# Patient Record
Sex: FEMALE | Race: WHITE | Employment: OTHER | URBAN - NONMETROPOLITAN AREA
[De-identification: names, ages, dates, MRNs, and addresses within clinical notes are randomized per-mention and may not be internally consistent; named-entity substitution may affect disease eponyms.]

---

## 2019-05-28 ENCOUNTER — OFFICE VISIT (OUTPATIENT)
Dept: FAMILY MEDICINE CLINIC | Age: 64
End: 2019-05-28
Payer: MEDICARE

## 2019-05-28 VITALS
SYSTOLIC BLOOD PRESSURE: 128 MMHG | TEMPERATURE: 98.3 F | WEIGHT: 195 LBS | BODY MASS INDEX: 38.28 KG/M2 | OXYGEN SATURATION: 98 % | HEART RATE: 101 BPM | DIASTOLIC BLOOD PRESSURE: 68 MMHG | HEIGHT: 60 IN

## 2019-05-28 DIAGNOSIS — M79.622 LEFT UPPER ARM PAIN: Primary | ICD-10-CM

## 2019-05-28 PROCEDURE — 99213 OFFICE O/P EST LOW 20 MIN: CPT | Performed by: INTERNAL MEDICINE

## 2019-05-28 RX ORDER — CYCLOBENZAPRINE HCL 10 MG
TABLET ORAL
Refills: 0 | COMMUNITY
Start: 2019-03-13 | End: 2019-08-21 | Stop reason: SDUPTHER

## 2019-05-28 RX ORDER — OMEPRAZOLE 20 MG/1
CAPSULE, DELAYED RELEASE ORAL
Refills: 1 | COMMUNITY
Start: 2019-04-15 | End: 2019-08-21 | Stop reason: SDUPTHER

## 2019-05-28 RX ORDER — NAPROXEN 500 MG/1
500 TABLET ORAL 2 TIMES DAILY WITH MEALS
Qty: 30 TABLET | Refills: 0 | Status: SHIPPED | OUTPATIENT
Start: 2019-05-28 | End: 2019-08-08

## 2019-05-28 NOTE — PROGRESS NOTES
19  Jayy Yu : 1955 Sex: female  Age: 70 y.o. Chief Complaint   Patient presents with    Swelling     left arm onset        HPI:  Patient reports pain to the left upper arm. The patient states the pain has been present for the last 3 days. Pain is currently on a 510 on the pain scale. The patient denies any trauma or injury. States swelling to upper arm in biceps area. They deny numbness or tingling to the distal extremity. No neck pain. The patient is having difficulty using the affected arm. It is affecting their quality of life due to difficulty performing activities of daily living. They are taking tylenol at home with minimal releif of their discomfort. No h/o blood clots, no ulcers or bleeding. No hear disease. They present for further evaluation. ROS:  Const: Positives and pertinent negatives as per HPI. All others reviewed and are negative. Current Outpatient Medications:     cyclobenzaprine (FLEXERIL) 10 MG tablet, TAKE 1/2 TO 1 TABLET BY MOUTH EVERY NIGHT AT BEDTIME AS NEEDED, Disp: , Rfl: 0    omeprazole (PRILOSEC) 20 MG delayed release capsule, TAKE ONE CAPSULE BY MOUTH EVERY DAY, Disp: , Rfl: 1    naproxen (NAPROSYN) 500 MG tablet, Take 1 tablet by mouth 2 times daily (with meals) for 15 days, Disp: 30 tablet, Rfl: 0    docusate sodium (COLACE) 100 MG capsule, Take 1 capsule by mouth 2 times daily, Disp: 60 capsule, Rfl: 0    sertraline (ZOLOFT) 50 MG tablet, Take 50 mg by mouth nightly, Disp: , Rfl:     Melatonin 2.5 MG CAPS, Take 5 mg by mouth daily, Disp: , Rfl:     LORazepam (ATIVAN) 0.5 MG tablet, Take 0.5 mg by mouth 2 times daily, Disp: , Rfl:     amitriptyline (ELAVIL) 50 MG tablet, Take 50 mg by mouth nightly. 11/2 TABS AT HS, Disp: , Rfl:     vitamin B-12 (CYANOCOBALAMIN) 1000 MCG tablet, Take 1,000 mcg by mouth daily , Disp: , Rfl:     Cholecalciferol (VITAMIN D3) 17416 UNITS CAPS, Take  by mouth once a week.  Last dose 6/15/13, Disp: , Rfl:      Allergies   Allergen Reactions    Promethazine-Phenylephrine      RESTLESS LEGS AND Arthea Hines       Past Medical History:   Diagnosis Date    Abdominal wound dehiscence     Arthritis     OSTEO    Depression     STABLE WITH MEDS PER PT    Sleep apnea     BI PAP      MACHINE PRE SET, PT DOES NOT KNOW SETTINGS     Past Surgical History:   Procedure Laterality Date    ABDOMEN SURGERY      ANKLE SURGERY      X2    HERNIA REPAIR      HYSTERECTOMY      LIPECTOMY      TONSILLECTOMY       No family history on file.   Social History     Socioeconomic History    Marital status:      Spouse name: Not on file    Number of children: Not on file    Years of education: Not on file    Highest education level: Not on file   Occupational History    Not on file   Social Needs    Financial resource strain: Not on file    Food insecurity:     Worry: Not on file     Inability: Not on file    Transportation needs:     Medical: Not on file     Non-medical: Not on file   Tobacco Use    Smoking status: Never Smoker    Smokeless tobacco: Never Used   Substance and Sexual Activity    Alcohol use: No    Drug use: No    Sexual activity: Not on file   Lifestyle    Physical activity:     Days per week: Not on file     Minutes per session: Not on file    Stress: Not on file   Relationships    Social connections:     Talks on phone: Not on file     Gets together: Not on file     Attends Yarsani service: Not on file     Active member of club or organization: Not on file     Attends meetings of clubs or organizations: Not on file     Relationship status: Not on file    Intimate partner violence:     Fear of current or ex partner: Not on file     Emotionally abused: Not on file     Physically abused: Not on file     Forced sexual activity: Not on file   Other Topics Concern    Not on file   Social History Narrative    Not on file       Vitals:    05/28/19 1147   BP: 128/68   Pulse: 101 Temp: 98.3 °F (36.8 °C)   SpO2: 98%   Weight: 195 lb (88.5 kg)   Height: 5' (1.524 m)       Exam:  Physical Exam   Constitutional: She is oriented to person, place, and time. She appears well-developed and well-nourished. HENT:   Head: Normocephalic and atraumatic. Right Ear: External ear normal.   Left Ear: External ear normal.   Mouth/Throat: Oropharynx is clear and moist.   Eyes: Pupils are equal, round, and reactive to light. EOM are normal.   Neck: Normal range of motion. Neck supple. No thyromegaly present. Cardiovascular: Normal rate, regular rhythm and normal heart sounds. No murmur heard. Pulmonary/Chest: Effort normal and breath sounds normal. She has no wheezes. Abdominal: Soft. Bowel sounds are normal. She exhibits no distension. There is no tenderness. There is no rebound and no guarding. Musculoskeletal: Normal range of motion. She exhibits no edema. Left upper arm: She exhibits tenderness, swelling and deformity. Lymphadenopathy:     She has no cervical adenopathy. Neurological: She is alert and oriented to person, place, and time. No cranial nerve deficit. Skin: Skin is warm and dry. Psychiatric: She has a normal mood and affect. Judgment normal.       Assessment and Plan:   Mary Calloway was seen today for swelling. Diagnoses and all orders for this visit:    Left upper arm pain  Comments:  uncertain etiology   possible biceps tendon injury   vascular and non vascular Ultrasound  naproxen, ortho referral   Orders:  -     Cancel: US SOFT TISSUE LIMITED AREA; Future  -     US DUP UPPER EXTREMITY LEFT VENOUS; Future  -     naproxen (NAPROSYN) 500 MG tablet; Take 1 tablet by mouth 2 times daily (with meals) for 15 days  -     External Referral To Orthopedic Surgery      Follow-up with your primary care provider in 7-10 days for re-evaluation and further management. Return  sooner or go to the Emergency Department immediately if your condition changes or worsens.     Return in about 1 week (around 6/4/2019) for check up and review.       Santi Goel MD

## 2019-06-03 ENCOUNTER — TELEPHONE (OUTPATIENT)
Dept: FAMILY MEDICINE CLINIC | Age: 64
End: 2019-06-03

## 2019-06-03 DIAGNOSIS — M79.602 LEFT ARM PAIN: Primary | ICD-10-CM

## 2019-06-11 ENCOUNTER — OFFICE VISIT (OUTPATIENT)
Dept: ORTHOPEDIC SURGERY | Age: 64
End: 2019-06-11
Payer: MEDICARE

## 2019-06-11 VITALS
HEIGHT: 60 IN | WEIGHT: 203 LBS | HEART RATE: 82 BPM | SYSTOLIC BLOOD PRESSURE: 138 MMHG | BODY MASS INDEX: 39.85 KG/M2 | DIASTOLIC BLOOD PRESSURE: 88 MMHG | TEMPERATURE: 97.6 F

## 2019-06-11 DIAGNOSIS — M79.622 LEFT UPPER ARM PAIN: Primary | ICD-10-CM

## 2019-06-11 PROCEDURE — 4040F PNEUMOC VAC/ADMIN/RCVD: CPT | Performed by: ORTHOPAEDIC SURGERY

## 2019-06-11 PROCEDURE — G8400 PT W/DXA NO RESULTS DOC: HCPCS | Performed by: ORTHOPAEDIC SURGERY

## 2019-06-11 PROCEDURE — 99203 OFFICE O/P NEW LOW 30 MIN: CPT | Performed by: ORTHOPAEDIC SURGERY

## 2019-06-11 PROCEDURE — 3017F COLORECTAL CA SCREEN DOC REV: CPT | Performed by: ORTHOPAEDIC SURGERY

## 2019-06-11 PROCEDURE — 1036F TOBACCO NON-USER: CPT | Performed by: ORTHOPAEDIC SURGERY

## 2019-06-11 PROCEDURE — G8427 DOCREV CUR MEDS BY ELIG CLIN: HCPCS | Performed by: ORTHOPAEDIC SURGERY

## 2019-06-11 PROCEDURE — A4565 SLINGS: HCPCS | Performed by: ORTHOPAEDIC SURGERY

## 2019-06-11 PROCEDURE — 1123F ACP DISCUSS/DSCN MKR DOCD: CPT | Performed by: ORTHOPAEDIC SURGERY

## 2019-06-11 PROCEDURE — G8417 CALC BMI ABV UP PARAM F/U: HCPCS | Performed by: ORTHOPAEDIC SURGERY

## 2019-06-11 PROCEDURE — 1090F PRES/ABSN URINE INCON ASSESS: CPT | Performed by: ORTHOPAEDIC SURGERY

## 2019-06-11 NOTE — PROGRESS NOTES
Chief Complaint:   Chief Complaint   Patient presents with    Arm Pain     Left upper arm (bicep area) pain and swelling. This started on 5/25/19 when pt. was picking up buckets of  feed for horses. Pt. had Venous US UE - no AVT. No xrays taken. Currently taking ES Tylenol with no relief. SHA Mitchell is a 70 y.o. female, who presents with a 2-week history of sharp severe and persistent anterior pain of the left shoulder and upper arm onset acutely after lifting a heavy bucket of feed for horses. Denies previous problems with his shoulder or arm no other joint complaints. Pain is been persistent not responding to relative rest or oral medication. Denies numbness tingling in the hand. Pain is predominantly in the subdeltoid to middle upper arm distribution aggravated with gravity or active motion. Allergies; medications; past medical, surgical, family, and social history; and problem list have been reviewed today and updated as indicated in this encounter - see below following Ortho specifics. Musculoskeletal: Lower extremities intact, right upper extremity normal.  Left upper extremity shows no visible or palpable deformity, no objective motor or sensory secretory deficits in the hand. Proximal left upper biceps is tender to palpation but I cannot appreciate any shortening, distal biceps palpable and intact. Positive impingement signs with secondary limited active and passive motion overhead, no effusion, positive Speed and Yergason test.    Radiologic Studies: X-rays of the left humerus today are unremarkable no evidence for fracture or neoplasia or other acquired condition. ASSESSMENT/PLAN:    Ivan De Leon was seen today for arm pain.     Diagnoses and all orders for this visit:    Left upper arm pain  -     XR HUMERUS LEFT (MIN 2 VIEWS)  -     Amb External Referral To Physical Therapy  -     [place] SLINGS       Differential diagnosis includes biceps tendinitis possible biceps tear, possible labral or cuff injury as well. Given the short duration of symptoms and the lack of significant objective finding beyond pain the patient was referred for physical therapy advised on relative rest given a sling for comfort and she will follow-up in 3 weeks for repeat exam and if symptoms are persistent formal imaging of the left shoulder and possible advanced imaging as well. Return in about 3 weeks (around 7/2/2019). Caroline Castañeda MD    6/11/2019  4:15 PM      There is no problem list on file for this patient. Past Medical History:   Diagnosis Date    Abdominal wound dehiscence     Arthritis     OSTEO    Depression     STABLE WITH MEDS PER PT    Sleep apnea     BI PAP      MACHINE PRE SET, PT DOES NOT KNOW SETTINGS       Past Surgical History:   Procedure Laterality Date    ABDOMEN SURGERY      ANKLE SURGERY      X2    HERNIA REPAIR      HYSTERECTOMY      LIPECTOMY      TONSILLECTOMY         Current Outpatient Medications   Medication Sig Dispense Refill    cyclobenzaprine (FLEXERIL) 10 MG tablet TAKE 1/2 TO 1 TABLET BY MOUTH EVERY NIGHT AT BEDTIME AS NEEDED  0    omeprazole (PRILOSEC) 20 MG delayed release capsule TAKE ONE CAPSULE BY MOUTH EVERY DAY  1    naproxen (NAPROSYN) 500 MG tablet Take 1 tablet by mouth 2 times daily (with meals) for 15 days 30 tablet 0    vitamin B-12 (CYANOCOBALAMIN) 1000 MCG tablet Take 1,000 mcg by mouth daily       Cholecalciferol (VITAMIN D3) 1000 units CAPS Take by mouth daily Last dose 6/15/13      sertraline (ZOLOFT) 50 MG tablet Take 50 mg by mouth nightly      LORazepam (ATIVAN) 0.5 MG tablet Take 0.5 mg by mouth 2 times daily      amitriptyline (ELAVIL) 50 MG tablet Take 50 mg by mouth nightly. 11/2 TABS AT HS       No current facility-administered medications for this visit.         Allergies   Allergen Reactions    Promethazine-Phenylephrine      RESTLESS LEGS AND EMISIS        [facility]       Social History     Socioeconomic History    Marital status:      Spouse name: None    Number of children: None    Years of education: None    Highest education level: None   Occupational History    None   Social Needs    Financial resource strain: None    Food insecurity:     Worry: None     Inability: None    Transportation needs:     Medical: None     Non-medical: None   Tobacco Use    Smoking status: Never Smoker    Smokeless tobacco: Never Used   Substance and Sexual Activity    Alcohol use: No    Drug use: No    Sexual activity: None   Lifestyle    Physical activity:     Days per week: None     Minutes per session: None    Stress: None   Relationships    Social connections:     Talks on phone: None     Gets together: None     Attends Oriental orthodox service: None     Active member of club or organization: None     Attends meetings of clubs or organizations: None     Relationship status: None    Intimate partner violence:     Fear of current or ex partner: None     Emotionally abused: None     Physically abused: None     Forced sexual activity: None   Other Topics Concern    None   Social History Narrative    None       No family history on file. Review of Systems  As follows except as previously noted in HPI:  Constitutional: Negative for chills, diaphoresis, fatigue, fever and unexpected weight change. Respiratory: Negative for cough, shortness of breath and wheezing. Cardiovascular: Negative for chest pain and palpitations. Neurological: Negative for dizziness, syncope, cephalgia. GI / : negative  Musculoskeletal: see HPI       Objective:   Physical Exam   Constitutional: Oriented to person, place, and time. and appears well-developed and well-nourished. :   Head: Normocephalic and atraumatic. Eyes: EOM are normal.   Neck: Neck supple. Cardiovascular: Normal rate and regular rhythm. Pulmonary/Chest: Effort normal. No stridor. No respiratory distress, no wheezes. Abdominal:  No abnormal distension. Neurological: Alert and oriented to person, place, and time. Skin: Skin is warm and dry. Psychiatric: Normal mood and affect.  Behavior is normal. Thought content normal.

## 2019-07-09 ENCOUNTER — OFFICE VISIT (OUTPATIENT)
Dept: ORTHOPEDIC SURGERY | Age: 64
End: 2019-07-09
Payer: MEDICARE

## 2019-07-09 VITALS
DIASTOLIC BLOOD PRESSURE: 89 MMHG | WEIGHT: 193 LBS | HEART RATE: 96 BPM | TEMPERATURE: 97.3 F | SYSTOLIC BLOOD PRESSURE: 140 MMHG | BODY MASS INDEX: 37.89 KG/M2 | HEIGHT: 60 IN

## 2019-07-09 DIAGNOSIS — M79.622 LEFT UPPER ARM PAIN: Primary | ICD-10-CM

## 2019-07-09 PROCEDURE — 1123F ACP DISCUSS/DSCN MKR DOCD: CPT | Performed by: ORTHOPAEDIC SURGERY

## 2019-07-09 PROCEDURE — G8400 PT W/DXA NO RESULTS DOC: HCPCS | Performed by: ORTHOPAEDIC SURGERY

## 2019-07-09 PROCEDURE — 4040F PNEUMOC VAC/ADMIN/RCVD: CPT | Performed by: ORTHOPAEDIC SURGERY

## 2019-07-09 PROCEDURE — G8427 DOCREV CUR MEDS BY ELIG CLIN: HCPCS | Performed by: ORTHOPAEDIC SURGERY

## 2019-07-09 PROCEDURE — 3017F COLORECTAL CA SCREEN DOC REV: CPT | Performed by: ORTHOPAEDIC SURGERY

## 2019-07-09 PROCEDURE — 99213 OFFICE O/P EST LOW 20 MIN: CPT | Performed by: ORTHOPAEDIC SURGERY

## 2019-07-09 PROCEDURE — G8417 CALC BMI ABV UP PARAM F/U: HCPCS | Performed by: ORTHOPAEDIC SURGERY

## 2019-07-09 PROCEDURE — 1036F TOBACCO NON-USER: CPT | Performed by: ORTHOPAEDIC SURGERY

## 2019-07-09 PROCEDURE — 1090F PRES/ABSN URINE INCON ASSESS: CPT | Performed by: ORTHOPAEDIC SURGERY

## 2019-08-08 ENCOUNTER — OFFICE VISIT (OUTPATIENT)
Dept: ORTHOPEDIC SURGERY | Age: 64
End: 2019-08-08
Payer: MEDICARE

## 2019-08-08 VITALS
TEMPERATURE: 98.3 F | SYSTOLIC BLOOD PRESSURE: 140 MMHG | HEART RATE: 92 BPM | DIASTOLIC BLOOD PRESSURE: 87 MMHG | HEIGHT: 60 IN | WEIGHT: 193 LBS | BODY MASS INDEX: 37.89 KG/M2

## 2019-08-08 DIAGNOSIS — M79.622 LEFT UPPER ARM PAIN: Primary | ICD-10-CM

## 2019-08-08 PROCEDURE — G8427 DOCREV CUR MEDS BY ELIG CLIN: HCPCS | Performed by: ORTHOPAEDIC SURGERY

## 2019-08-08 PROCEDURE — 1090F PRES/ABSN URINE INCON ASSESS: CPT | Performed by: ORTHOPAEDIC SURGERY

## 2019-08-08 PROCEDURE — G8400 PT W/DXA NO RESULTS DOC: HCPCS | Performed by: ORTHOPAEDIC SURGERY

## 2019-08-08 PROCEDURE — 99213 OFFICE O/P EST LOW 20 MIN: CPT | Performed by: ORTHOPAEDIC SURGERY

## 2019-08-08 PROCEDURE — 1036F TOBACCO NON-USER: CPT | Performed by: ORTHOPAEDIC SURGERY

## 2019-08-08 PROCEDURE — 1123F ACP DISCUSS/DSCN MKR DOCD: CPT | Performed by: ORTHOPAEDIC SURGERY

## 2019-08-08 PROCEDURE — 3017F COLORECTAL CA SCREEN DOC REV: CPT | Performed by: ORTHOPAEDIC SURGERY

## 2019-08-08 PROCEDURE — 4040F PNEUMOC VAC/ADMIN/RCVD: CPT | Performed by: ORTHOPAEDIC SURGERY

## 2019-08-08 PROCEDURE — 3014F SCREEN MAMMO DOC REV: CPT | Performed by: ORTHOPAEDIC SURGERY

## 2019-08-08 PROCEDURE — G8417 CALC BMI ABV UP PARAM F/U: HCPCS | Performed by: ORTHOPAEDIC SURGERY

## 2019-08-08 RX ORDER — ACETAMINOPHEN 325 MG/1
650 TABLET ORAL EVERY 6 HOURS PRN
COMMUNITY
End: 2022-04-11 | Stop reason: CLARIF

## 2019-08-13 ENCOUNTER — TELEPHONE (OUTPATIENT)
Dept: ORTHOPEDIC SURGERY | Age: 64
End: 2019-08-13

## 2019-08-13 NOTE — TELEPHONE ENCOUNTER
Phoned patient regarding MRI lt shoulder. Informed her I was in the process of getting it authorized, saw she was on the schedule at [facility] 8/15/19 @ 6:12PV.GQN to pre-cert. Patient states would like to keep that appointment date and time of the MRI. Follow up appointment scheduled to go over results.

## 2019-08-15 ENCOUNTER — HOSPITAL ENCOUNTER (OUTPATIENT)
Dept: MRI IMAGING | Age: 64
Discharge: HOME | End: 2019-08-17
Payer: MEDICARE

## 2019-08-15 DIAGNOSIS — M79.622 LEFT UPPER ARM PAIN: ICD-10-CM

## 2019-08-15 PROCEDURE — 73221 MRI JOINT UPR EXTREM W/O DYE: CPT

## 2019-08-19 ENCOUNTER — TELEPHONE (OUTPATIENT)
Dept: ORTHOPEDIC SURGERY | Age: 64
End: 2019-08-19

## 2019-08-19 NOTE — TELEPHONE ENCOUNTER
----- Message from Nina Marcial MD sent at 8/16/2019  3:08 PM EDT -----  Regarding: FW:  Please arrange for her to follow up with Dr. Emmie Manjarrez possibly surgery left shoulder.   Thanks.  ----- Message -----  From: Nisreen Nixon Incoming Radiant Results From [facility]  Sent: 8/15/2019   6:49 PM EDT  To: Nina Marcial MD

## 2019-08-21 ENCOUNTER — TELEPHONE (OUTPATIENT)
Dept: FAMILY MEDICINE CLINIC | Age: 64
End: 2019-08-21

## 2019-08-21 RX ORDER — CYCLOBENZAPRINE HCL 10 MG
10 TABLET ORAL DAILY
Qty: 30 TABLET | Refills: 0 | Status: SHIPPED | OUTPATIENT
Start: 2019-08-21 | End: 2019-10-07 | Stop reason: SDUPTHER

## 2019-08-21 RX ORDER — OMEPRAZOLE 20 MG/1
20 CAPSULE, DELAYED RELEASE ORAL DAILY
Qty: 30 CAPSULE | Refills: 0 | Status: SHIPPED | OUTPATIENT
Start: 2019-08-21 | End: 2019-10-07 | Stop reason: SDUPTHER

## 2019-09-04 ENCOUNTER — OFFICE VISIT (OUTPATIENT)
Dept: ORTHOPEDIC SURGERY | Age: 64
End: 2019-09-04
Payer: MEDICARE

## 2019-09-04 VITALS
HEART RATE: 75 BPM | DIASTOLIC BLOOD PRESSURE: 95 MMHG | SYSTOLIC BLOOD PRESSURE: 149 MMHG | HEIGHT: 61 IN | BODY MASS INDEX: 38.33 KG/M2 | WEIGHT: 203 LBS

## 2019-09-04 DIAGNOSIS — M79.622 LEFT UPPER ARM PAIN: Primary | ICD-10-CM

## 2019-09-04 PROCEDURE — 3014F SCREEN MAMMO DOC REV: CPT | Performed by: ORTHOPAEDIC SURGERY

## 2019-09-04 PROCEDURE — 20610 DRAIN/INJ JOINT/BURSA W/O US: CPT | Performed by: ORTHOPAEDIC SURGERY

## 2019-09-04 PROCEDURE — 1090F PRES/ABSN URINE INCON ASSESS: CPT | Performed by: ORTHOPAEDIC SURGERY

## 2019-09-04 PROCEDURE — 1036F TOBACCO NON-USER: CPT | Performed by: ORTHOPAEDIC SURGERY

## 2019-09-04 PROCEDURE — G8427 DOCREV CUR MEDS BY ELIG CLIN: HCPCS | Performed by: ORTHOPAEDIC SURGERY

## 2019-09-04 PROCEDURE — G8400 PT W/DXA NO RESULTS DOC: HCPCS | Performed by: ORTHOPAEDIC SURGERY

## 2019-09-04 PROCEDURE — 1123F ACP DISCUSS/DSCN MKR DOCD: CPT | Performed by: ORTHOPAEDIC SURGERY

## 2019-09-04 PROCEDURE — G8417 CALC BMI ABV UP PARAM F/U: HCPCS | Performed by: ORTHOPAEDIC SURGERY

## 2019-09-04 PROCEDURE — 99214 OFFICE O/P EST MOD 30 MIN: CPT | Performed by: ORTHOPAEDIC SURGERY

## 2019-09-04 PROCEDURE — 3017F COLORECTAL CA SCREEN DOC REV: CPT | Performed by: ORTHOPAEDIC SURGERY

## 2019-09-04 PROCEDURE — 4040F PNEUMOC VAC/ADMIN/RCVD: CPT | Performed by: ORTHOPAEDIC SURGERY

## 2019-09-04 RX ORDER — TRIAMCINOLONE ACETONIDE 40 MG/ML
40 INJECTION, SUSPENSION INTRA-ARTICULAR; INTRAMUSCULAR ONCE
Status: COMPLETED | OUTPATIENT
Start: 2019-09-04 | End: 2019-09-04

## 2019-09-04 RX ORDER — ASPIRIN 81 MG/1
TABLET ORAL
COMMUNITY
Start: 2018-06-11

## 2019-09-04 RX ORDER — LIDOCAINE HYDROCHLORIDE 10 MG/ML
2 INJECTION, SOLUTION INFILTRATION; PERINEURAL ONCE
Status: COMPLETED | OUTPATIENT
Start: 2019-09-04 | End: 2019-09-04

## 2019-09-04 RX ADMIN — TRIAMCINOLONE ACETONIDE 40 MG: 40 INJECTION, SUSPENSION INTRA-ARTICULAR; INTRAMUSCULAR at 10:37

## 2019-09-04 RX ADMIN — LIDOCAINE HYDROCHLORIDE 2 ML: 10 INJECTION, SOLUTION INFILTRATION; PERINEURAL at 10:36

## 2019-09-20 ENCOUNTER — OFFICE VISIT (OUTPATIENT)
Dept: FAMILY MEDICINE CLINIC | Age: 64
End: 2019-09-20
Payer: MEDICARE

## 2019-09-20 VITALS
OXYGEN SATURATION: 98 % | HEIGHT: 60 IN | HEART RATE: 91 BPM | DIASTOLIC BLOOD PRESSURE: 82 MMHG | TEMPERATURE: 98 F | SYSTOLIC BLOOD PRESSURE: 128 MMHG | BODY MASS INDEX: 39.66 KG/M2 | WEIGHT: 202 LBS

## 2019-09-20 DIAGNOSIS — M54.6 ACUTE BILATERAL THORACIC BACK PAIN: Primary | ICD-10-CM

## 2019-09-20 PROCEDURE — 3017F COLORECTAL CA SCREEN DOC REV: CPT | Performed by: NURSE PRACTITIONER

## 2019-09-20 PROCEDURE — 1123F ACP DISCUSS/DSCN MKR DOCD: CPT | Performed by: NURSE PRACTITIONER

## 2019-09-20 PROCEDURE — 1090F PRES/ABSN URINE INCON ASSESS: CPT | Performed by: NURSE PRACTITIONER

## 2019-09-20 PROCEDURE — 1036F TOBACCO NON-USER: CPT | Performed by: NURSE PRACTITIONER

## 2019-09-20 PROCEDURE — 96372 THER/PROPH/DIAG INJ SC/IM: CPT | Performed by: NURSE PRACTITIONER

## 2019-09-20 PROCEDURE — 4040F PNEUMOC VAC/ADMIN/RCVD: CPT | Performed by: NURSE PRACTITIONER

## 2019-09-20 PROCEDURE — 99213 OFFICE O/P EST LOW 20 MIN: CPT | Performed by: NURSE PRACTITIONER

## 2019-09-20 PROCEDURE — 3014F SCREEN MAMMO DOC REV: CPT | Performed by: NURSE PRACTITIONER

## 2019-09-20 PROCEDURE — G8417 CALC BMI ABV UP PARAM F/U: HCPCS | Performed by: NURSE PRACTITIONER

## 2019-09-20 PROCEDURE — G8400 PT W/DXA NO RESULTS DOC: HCPCS | Performed by: NURSE PRACTITIONER

## 2019-09-20 PROCEDURE — G8427 DOCREV CUR MEDS BY ELIG CLIN: HCPCS | Performed by: NURSE PRACTITIONER

## 2019-09-20 RX ORDER — METHYLPREDNISOLONE ACETATE 40 MG/ML
40 INJECTION, SUSPENSION INTRA-ARTICULAR; INTRALESIONAL; INTRAMUSCULAR; SOFT TISSUE ONCE
Status: COMPLETED | OUTPATIENT
Start: 2019-09-20 | End: 2019-09-20

## 2019-09-20 RX ORDER — PREDNISONE 10 MG/1
TABLET ORAL
Qty: 13 TABLET | Refills: 0 | Status: SHIPPED | OUTPATIENT
Start: 2019-09-20 | End: 2019-10-07

## 2019-09-20 RX ORDER — VALACYCLOVIR HYDROCHLORIDE 500 MG/1
500 TABLET, FILM COATED ORAL 3 TIMES DAILY
Qty: 21 TABLET | Refills: 0 | Status: SHIPPED | OUTPATIENT
Start: 2019-09-20 | End: 2019-10-07

## 2019-09-20 RX ADMIN — METHYLPREDNISOLONE ACETATE 40 MG: 40 INJECTION, SUSPENSION INTRA-ARTICULAR; INTRALESIONAL; INTRAMUSCULAR; SOFT TISSUE at 11:31

## 2019-09-20 ASSESSMENT — PATIENT HEALTH QUESTIONNAIRE - PHQ9
2. FEELING DOWN, DEPRESSED OR HOPELESS: 1
SUM OF ALL RESPONSES TO PHQ QUESTIONS 1-9: 2
1. LITTLE INTEREST OR PLEASURE IN DOING THINGS: 1
SUM OF ALL RESPONSES TO PHQ9 QUESTIONS 1 & 2: 2
SUM OF ALL RESPONSES TO PHQ QUESTIONS 1-9: 2

## 2019-09-20 NOTE — PROGRESS NOTES
Allergies   Allergen Reactions    Promethazine-Phenylephrine      RESTLESS LEGS AND Jose Parents        Past Medical History:   Diagnosis Date    Abdominal wound dehiscence     Arthritis     OSTEO    Depression     STABLE WITH MEDS PER PT    Sleep apnea     BI PAP      MACHINE PRE SET, PT DOES NOT KNOW SETTINGS     No family history on file.    Past Surgical History:   Procedure Laterality Date    ABDOMEN SURGERY      ANKLE SURGERY      X2    HERNIA REPAIR      HYSTERECTOMY      LIPECTOMY      TONSILLECTOMY        Social History     Socioeconomic History    Marital status:      Spouse name: Not on file    Number of children: Not on file    Years of education: Not on file    Highest education level: Not on file   Occupational History    Not on file   Social Needs    Financial resource strain: Not on file    Food insecurity:     Worry: Not on file     Inability: Not on file    Transportation needs:     Medical: Not on file     Non-medical: Not on file   Tobacco Use    Smoking status: Never Smoker    Smokeless tobacco: Never Used   Substance and Sexual Activity    Alcohol use: No    Drug use: No    Sexual activity: Not on file   Lifestyle    Physical activity:     Days per week: Not on file     Minutes per session: Not on file    Stress: Not on file   Relationships    Social connections:     Talks on phone: Not on file     Gets together: Not on file     Attends Faith service: Not on file     Active member of club or organization: Not on file     Attends meetings of clubs or organizations: Not on file     Relationship status: Not on file    Intimate partner violence:     Fear of current or ex partner: Not on file     Emotionally abused: Not on file     Physically abused: Not on file     Forced sexual activity: Not on file   Other Topics Concern    Not on file   Social History Narrative    Not on file        Objective:  Vitals:    09/20/19 1112   BP: 128/82   Pulse: 91   Temp: 98 °F (36.7 °C)   TempSrc: Oral   SpO2: 98%   Weight: 202 lb (91.6 kg)   Height: 5' (1.524 m)        Physical Exam   Constitutional: She is oriented to person, place, and time. She appears well-developed and well-nourished. No distress. HENT:   Head: Normocephalic and atraumatic. Right Ear: Hearing and external ear normal.   Left Ear: Hearing and external ear normal.   Nose: Nose normal.   Mouth/Throat: Mucous membranes are normal.   Eyes: Pupils are equal, round, and reactive to light. Conjunctivae and EOM are normal.   Neck: Normal range of motion. Neck supple. Cardiovascular: Normal rate, regular rhythm, normal heart sounds and intact distal pulses. No murmur heard. Pulmonary/Chest: Effort normal and breath sounds normal. No respiratory distress. She has no wheezes. She has no rales. Abdominal: Soft. Bowel sounds are normal.   Musculoskeletal: Normal range of motion. She exhibits tenderness. She exhibits no edema or deformity. Pain is mid thoracic radiating to sternum. Some spasms. Neurological: She is alert and oriented to person, place, and time. She has normal strength and normal reflexes. No cranial nerve deficit or sensory deficit. Skin: Skin is warm, dry and intact. Capillary refill takes less than 2 seconds. No rash noted. Psychiatric: She has a normal mood and affect. Her speech is normal and behavior is normal. Judgment and thought content normal. Cognition and memory are normal.   Vitals reviewed. Claudell Ferraris was seen today for back pain. Diagnoses and all orders for this visit:    Acute bilateral thoracic back pain    Other orders  -     valACYclovir (VALTREX) 500 MG tablet; Take 1 tablet by mouth 3 times daily  -     predniSONE (DELTASONE) 10 MG tablet; Take 3 tabs X 2 days, 2 tabs X 2 days 1 tab X 2 days 1/2 Tab X 2 days  -     methylPREDNISolone acetate (DEPO-MEDROL) injection 40 mg     So I discussed why I was also giving the valacyclovir.  Very unusual presentation

## 2019-09-23 ENCOUNTER — HOSPITAL ENCOUNTER (OUTPATIENT)
Age: 64
Discharge: HOME | End: 2019-09-25
Payer: MEDICARE

## 2019-09-23 LAB
ALT SERPL-CCNC: 21 U/L (ref 0–32)
ANION GAP SERPL CALCULATED.3IONS-SCNC: 11 MMOL/L (ref 7–16)
AST SERPL-CCNC: 14 U/L (ref 0–31)
BACTERIA: ABNORMAL /HPF
BASOPHILS ABSOLUTE: 0.04 E9/L (ref 0–0.2)
BASOPHILS RELATIVE PERCENT: 0.3 % (ref 0–2)
BILIRUBIN URINE: NEGATIVE
BLOOD, URINE: NEGATIVE
BUN BLDV-MCNC: 16 MG/DL (ref 8–23)
CALCIUM SERPL-MCNC: 8.9 MG/DL (ref 8.6–10.2)
CHLORIDE BLD-SCNC: 107 MMOL/L (ref 98–107)
CHOLESTEROL, TOTAL: 193 MG/DL (ref 0–199)
CLARITY: CLEAR
CO2: 25 MMOL/L (ref 22–29)
COLOR: YELLOW
CREAT SERPL-MCNC: 0.7 MG/DL (ref 0.5–1)
EOSINOPHILS ABSOLUTE: 0.07 E9/L (ref 0.05–0.5)
EOSINOPHILS RELATIVE PERCENT: 0.6 % (ref 0–6)
EPITHELIAL CELLS, UA: ABNORMAL /HPF
GFR AFRICAN AMERICAN: >60
GFR NON-AFRICAN AMERICAN: >60 ML/MIN/1.73
GLUCOSE BLD-MCNC: 100 MG/DL (ref 74–99)
GLUCOSE URINE: NEGATIVE MG/DL
HCT VFR BLD CALC: 47.2 % (ref 34–48)
HDLC SERPL-MCNC: 49 MG/DL
HEMOGLOBIN: 15 G/DL (ref 11.5–15.5)
IMMATURE GRANULOCYTES #: 0.05 E9/L
IMMATURE GRANULOCYTES %: 0.4 % (ref 0–5)
KETONES, URINE: NEGATIVE MG/DL
LDL CHOLESTEROL CALCULATED: 124 MG/DL (ref 0–99)
LEUKOCYTE ESTERASE, URINE: ABNORMAL
LYMPHOCYTES ABSOLUTE: 3.09 E9/L (ref 1.5–4)
LYMPHOCYTES RELATIVE PERCENT: 26.2 % (ref 20–42)
MCH RBC QN AUTO: 28.7 PG (ref 26–35)
MCHC RBC AUTO-ENTMCNC: 31.8 % (ref 32–34.5)
MCV RBC AUTO: 90.4 FL (ref 80–99.9)
MONOCYTES ABSOLUTE: 0.76 E9/L (ref 0.1–0.95)
MONOCYTES RELATIVE PERCENT: 6.5 % (ref 2–12)
MUCUS: PRESENT
NEUTROPHILS ABSOLUTE: 7.77 E9/L (ref 1.8–7.3)
NEUTROPHILS RELATIVE PERCENT: 66 % (ref 43–80)
NITRITE, URINE: NEGATIVE
PDW BLD-RTO: 15.1 FL (ref 11.5–15)
PH UA: 6.5 (ref 5–9)
PLATELET # BLD: 280 E9/L (ref 130–450)
PMV BLD AUTO: 11.3 FL (ref 7–12)
POTASSIUM SERPL-SCNC: 3.9 MMOL/L (ref 3.5–5)
PROTEIN UA: NEGATIVE MG/DL
RBC # BLD: 5.22 E12/L (ref 3.5–5.5)
RBC UA: ABNORMAL /HPF (ref 0–2)
SODIUM BLD-SCNC: 143 MMOL/L (ref 132–146)
SPECIFIC GRAVITY UA: 1.01 (ref 1–1.03)
TRIGL SERPL-MCNC: 101 MG/DL (ref 0–149)
UROBILINOGEN, URINE: 0.2 E.U./DL
VLDLC SERPL CALC-MCNC: 20 MG/DL
WBC # BLD: 11.8 E9/L (ref 4.5–11.5)
WBC UA: ABNORMAL /HPF (ref 0–5)

## 2019-09-23 PROCEDURE — 85025 COMPLETE CBC W/AUTO DIFF WBC: CPT

## 2019-09-23 PROCEDURE — 84450 TRANSFERASE (AST) (SGOT): CPT

## 2019-09-23 PROCEDURE — 80061 LIPID PANEL: CPT

## 2019-09-23 PROCEDURE — 80048 BASIC METABOLIC PNL TOTAL CA: CPT

## 2019-09-23 PROCEDURE — 84460 ALANINE AMINO (ALT) (SGPT): CPT

## 2019-09-23 PROCEDURE — 36415 COLL VENOUS BLD VENIPUNCTURE: CPT

## 2019-09-23 PROCEDURE — 81001 URINALYSIS AUTO W/SCOPE: CPT

## 2019-10-07 ENCOUNTER — OFFICE VISIT (OUTPATIENT)
Dept: FAMILY MEDICINE CLINIC | Age: 64
End: 2019-10-07
Payer: MEDICARE

## 2019-10-07 VITALS
BODY MASS INDEX: 40.05 KG/M2 | TEMPERATURE: 97.3 F | HEIGHT: 60 IN | WEIGHT: 204 LBS | DIASTOLIC BLOOD PRESSURE: 74 MMHG | SYSTOLIC BLOOD PRESSURE: 130 MMHG | HEART RATE: 72 BPM | OXYGEN SATURATION: 97 %

## 2019-10-07 DIAGNOSIS — G47.33 OBSTRUCTIVE SLEEP APNEA SYNDROME: ICD-10-CM

## 2019-10-07 DIAGNOSIS — M19.90 ARTHRITIS: ICD-10-CM

## 2019-10-07 DIAGNOSIS — E66.01 CLASS 2 SEVERE OBESITY DUE TO EXCESS CALORIES WITH SERIOUS COMORBIDITY AND BODY MASS INDEX (BMI) OF 39.0 TO 39.9 IN ADULT (HCC): ICD-10-CM

## 2019-10-07 DIAGNOSIS — R91.8 PULMONARY NODULES: ICD-10-CM

## 2019-10-07 DIAGNOSIS — Z23 ENCOUNTER FOR IMMUNIZATION: Primary | ICD-10-CM

## 2019-10-07 DIAGNOSIS — M85.80 OSTEOPENIA, UNSPECIFIED LOCATION: ICD-10-CM

## 2019-10-07 DIAGNOSIS — E78.2 MIXED HYPERLIPIDEMIA: ICD-10-CM

## 2019-10-07 DIAGNOSIS — Z12.39 SCREENING FOR BREAST CANCER: ICD-10-CM

## 2019-10-07 DIAGNOSIS — F34.1 DYSTHYMIA: ICD-10-CM

## 2019-10-07 DIAGNOSIS — F41.9 ANXIETY: ICD-10-CM

## 2019-10-07 DIAGNOSIS — M48.061 SPINAL STENOSIS OF LUMBAR REGION, UNSPECIFIED WHETHER NEUROGENIC CLAUDICATION PRESENT: ICD-10-CM

## 2019-10-07 DIAGNOSIS — J45.20 MILD INTERMITTENT ASTHMA WITHOUT COMPLICATION: ICD-10-CM

## 2019-10-07 DIAGNOSIS — M79.7 FIBROMYALGIA: ICD-10-CM

## 2019-10-07 DIAGNOSIS — R73.01 IMPAIRED FASTING GLUCOSE: ICD-10-CM

## 2019-10-07 PROCEDURE — 90653 IIV ADJUVANT VACCINE IM: CPT | Performed by: FAMILY MEDICINE

## 2019-10-07 PROCEDURE — G8482 FLU IMMUNIZE ORDER/ADMIN: HCPCS | Performed by: FAMILY MEDICINE

## 2019-10-07 PROCEDURE — 1123F ACP DISCUSS/DSCN MKR DOCD: CPT | Performed by: FAMILY MEDICINE

## 2019-10-07 PROCEDURE — 3014F SCREEN MAMMO DOC REV: CPT | Performed by: FAMILY MEDICINE

## 2019-10-07 PROCEDURE — G8400 PT W/DXA NO RESULTS DOC: HCPCS | Performed by: FAMILY MEDICINE

## 2019-10-07 PROCEDURE — 3017F COLORECTAL CA SCREEN DOC REV: CPT | Performed by: FAMILY MEDICINE

## 2019-10-07 PROCEDURE — G0008 ADMIN INFLUENZA VIRUS VAC: HCPCS | Performed by: FAMILY MEDICINE

## 2019-10-07 PROCEDURE — G8427 DOCREV CUR MEDS BY ELIG CLIN: HCPCS | Performed by: FAMILY MEDICINE

## 2019-10-07 PROCEDURE — G8417 CALC BMI ABV UP PARAM F/U: HCPCS | Performed by: FAMILY MEDICINE

## 2019-10-07 PROCEDURE — 1036F TOBACCO NON-USER: CPT | Performed by: FAMILY MEDICINE

## 2019-10-07 PROCEDURE — 4040F PNEUMOC VAC/ADMIN/RCVD: CPT | Performed by: FAMILY MEDICINE

## 2019-10-07 PROCEDURE — 1090F PRES/ABSN URINE INCON ASSESS: CPT | Performed by: FAMILY MEDICINE

## 2019-10-07 PROCEDURE — 99214 OFFICE O/P EST MOD 30 MIN: CPT | Performed by: FAMILY MEDICINE

## 2019-10-07 RX ORDER — CYCLOBENZAPRINE HCL 10 MG
TABLET ORAL
Qty: 90 TABLET | Refills: 2 | Status: SHIPPED
Start: 2019-10-07 | End: 2020-02-10 | Stop reason: SDUPTHER

## 2019-10-07 RX ORDER — OMEPRAZOLE 20 MG/1
20 CAPSULE, DELAYED RELEASE ORAL DAILY
Qty: 90 CAPSULE | Refills: 1 | Status: SHIPPED
Start: 2019-10-07 | End: 2020-02-10 | Stop reason: SDUPTHER

## 2019-10-07 ASSESSMENT — ENCOUNTER SYMPTOMS
VOMITING: 0
ABDOMINAL PAIN: 0
DIARRHEA: 0
SHORTNESS OF BREATH: 0
CHEST TIGHTNESS: 0
BACK PAIN: 1
SINUS PAIN: 0
COLOR CHANGE: 0
PHOTOPHOBIA: 0
EYE ITCHING: 0
WHEEZING: 0
EYE REDNESS: 0
SINUS PRESSURE: 0
EYE PAIN: 0
BLOOD IN STOOL: 0
COUGH: 0
NAUSEA: 0
SORE THROAT: 0
CONSTIPATION: 0
ABDOMINAL DISTENTION: 0

## 2019-10-24 ENCOUNTER — OFFICE VISIT (OUTPATIENT)
Dept: FAMILY MEDICINE CLINIC | Age: 64
End: 2019-10-24
Payer: MEDICARE

## 2019-10-24 VITALS
OXYGEN SATURATION: 96 % | BODY MASS INDEX: 39.66 KG/M2 | HEIGHT: 60 IN | DIASTOLIC BLOOD PRESSURE: 70 MMHG | HEART RATE: 89 BPM | SYSTOLIC BLOOD PRESSURE: 120 MMHG | WEIGHT: 202 LBS | TEMPERATURE: 96.7 F

## 2019-10-24 DIAGNOSIS — J01.40 ACUTE NON-RECURRENT PANSINUSITIS: Primary | ICD-10-CM

## 2019-10-24 PROCEDURE — 96372 THER/PROPH/DIAG INJ SC/IM: CPT | Performed by: FAMILY MEDICINE

## 2019-10-24 PROCEDURE — G8417 CALC BMI ABV UP PARAM F/U: HCPCS | Performed by: FAMILY MEDICINE

## 2019-10-24 PROCEDURE — G8482 FLU IMMUNIZE ORDER/ADMIN: HCPCS | Performed by: FAMILY MEDICINE

## 2019-10-24 PROCEDURE — 1090F PRES/ABSN URINE INCON ASSESS: CPT | Performed by: FAMILY MEDICINE

## 2019-10-24 PROCEDURE — 1123F ACP DISCUSS/DSCN MKR DOCD: CPT | Performed by: FAMILY MEDICINE

## 2019-10-24 PROCEDURE — 3017F COLORECTAL CA SCREEN DOC REV: CPT | Performed by: FAMILY MEDICINE

## 2019-10-24 PROCEDURE — 1036F TOBACCO NON-USER: CPT | Performed by: FAMILY MEDICINE

## 2019-10-24 PROCEDURE — G8400 PT W/DXA NO RESULTS DOC: HCPCS | Performed by: FAMILY MEDICINE

## 2019-10-24 PROCEDURE — 4040F PNEUMOC VAC/ADMIN/RCVD: CPT | Performed by: FAMILY MEDICINE

## 2019-10-24 PROCEDURE — 99213 OFFICE O/P EST LOW 20 MIN: CPT | Performed by: FAMILY MEDICINE

## 2019-10-24 PROCEDURE — G9899 SCRN MAM PERF RSLTS DOC: HCPCS | Performed by: FAMILY MEDICINE

## 2019-10-24 PROCEDURE — G8427 DOCREV CUR MEDS BY ELIG CLIN: HCPCS | Performed by: FAMILY MEDICINE

## 2019-10-24 RX ORDER — PREDNISONE 10 MG/1
TABLET ORAL
Qty: 18 TABLET | Refills: 0 | Status: SHIPPED | OUTPATIENT
Start: 2019-10-24 | End: 2019-11-02

## 2019-10-24 RX ORDER — AMOXICILLIN 500 MG/1
500 CAPSULE ORAL 3 TIMES DAILY
Qty: 30 CAPSULE | Refills: 0 | Status: SHIPPED | OUTPATIENT
Start: 2019-10-24 | End: 2019-11-03

## 2019-10-24 RX ORDER — METHYLPREDNISOLONE ACETATE 40 MG/ML
40 INJECTION, SUSPENSION INTRA-ARTICULAR; INTRALESIONAL; INTRAMUSCULAR; SOFT TISSUE ONCE
Status: COMPLETED | OUTPATIENT
Start: 2019-10-24 | End: 2019-10-24

## 2019-10-24 RX ADMIN — METHYLPREDNISOLONE ACETATE 40 MG: 40 INJECTION, SUSPENSION INTRA-ARTICULAR; INTRALESIONAL; INTRAMUSCULAR; SOFT TISSUE at 09:09

## 2019-10-24 ASSESSMENT — ENCOUNTER SYMPTOMS
BLOOD IN STOOL: 0
PHOTOPHOBIA: 0
NAUSEA: 0
BACK PAIN: 0
SINUS PAIN: 1
SHORTNESS OF BREATH: 0
VOMITING: 0
SORE THROAT: 1
EYE REDNESS: 0
CHEST TIGHTNESS: 0
COUGH: 1
TROUBLE SWALLOWING: 0
DIARRHEA: 0
EYE DISCHARGE: 0
EYE PAIN: 0
ALLERGIC/IMMUNOLOGIC NEGATIVE: 1
ABDOMINAL PAIN: 0

## 2019-10-28 ENCOUNTER — TELEPHONE (OUTPATIENT)
Dept: FAMILY MEDICINE CLINIC | Age: 64
End: 2019-10-28

## 2019-10-28 DIAGNOSIS — R05.9 COUGH: Primary | ICD-10-CM

## 2019-10-28 RX ORDER — BENZONATATE 100 MG/1
100 CAPSULE ORAL 2 TIMES DAILY PRN
Qty: 20 CAPSULE | Refills: 0 | Status: SHIPPED | OUTPATIENT
Start: 2019-10-28 | End: 2019-11-04

## 2019-11-06 PROBLEM — Z12.39 SCREENING FOR BREAST CANCER: Status: RESOLVED | Noted: 2019-10-07 | Resolved: 2019-11-06

## 2020-01-16 ENCOUNTER — OFFICE VISIT (OUTPATIENT)
Dept: PODIATRY | Age: 65
End: 2020-01-16
Payer: MEDICARE

## 2020-01-16 VITALS
SYSTOLIC BLOOD PRESSURE: 130 MMHG | HEIGHT: 62 IN | WEIGHT: 203 LBS | BODY MASS INDEX: 37.36 KG/M2 | DIASTOLIC BLOOD PRESSURE: 70 MMHG | TEMPERATURE: 97.3 F

## 2020-01-16 PROCEDURE — 1036F TOBACCO NON-USER: CPT | Performed by: PODIATRIST

## 2020-01-16 PROCEDURE — G8482 FLU IMMUNIZE ORDER/ADMIN: HCPCS | Performed by: PODIATRIST

## 2020-01-16 PROCEDURE — G8417 CALC BMI ABV UP PARAM F/U: HCPCS | Performed by: PODIATRIST

## 2020-01-16 PROCEDURE — 99214 OFFICE O/P EST MOD 30 MIN: CPT | Performed by: PODIATRIST

## 2020-01-16 PROCEDURE — L4360 PNEUMAT WALKING BOOT PRE CST: HCPCS | Performed by: PODIATRIST

## 2020-01-16 PROCEDURE — 3017F COLORECTAL CA SCREEN DOC REV: CPT | Performed by: PODIATRIST

## 2020-01-16 PROCEDURE — G8427 DOCREV CUR MEDS BY ELIG CLIN: HCPCS | Performed by: PODIATRIST

## 2020-01-16 PROCEDURE — 1123F ACP DISCUSS/DSCN MKR DOCD: CPT | Performed by: PODIATRIST

## 2020-01-16 PROCEDURE — G9899 SCRN MAM PERF RSLTS DOC: HCPCS | Performed by: PODIATRIST

## 2020-01-16 PROCEDURE — 1090F PRES/ABSN URINE INCON ASSESS: CPT | Performed by: PODIATRIST

## 2020-01-16 PROCEDURE — G8400 PT W/DXA NO RESULTS DOC: HCPCS | Performed by: PODIATRIST

## 2020-01-16 PROCEDURE — 4040F PNEUMOC VAC/ADMIN/RCVD: CPT | Performed by: PODIATRIST

## 2020-01-16 NOTE — PROGRESS NOTES
20  Herb Marin : 1955 Sex: female  Age: 75 y.o. Patient was referred by Ryan Diop MD    Chief Complaint   Patient presents with    Foot Pain     pt was last seen in 2018 for ingrown toenail presents today for lump along fifth met b/l left worse then right     Toe Pain     nail care saw PCP Mulu Nino on 10/7/2019 she contused her right foot along fifth met wants that looked at too sent for xrays        SUBJECTIVE patient today seen for a new issues she is complaining about left foot pain this is been ongoing for over 2 months no trauma noted patient also said she bumped her right foot several weeks ago and that is feeling much better  Foot Pain    The pain is present in the right foot and left foot. This is a new problem. The current episode started more than 1 month ago. The problem occurs 2 to 4 times per day. The problem has been rapidly worsening. The pain is at a severity of 3/10.    Toe Pain        Review of Systems  Const: Denies constitutional symptoms  Musculo: Denies symptoms other than stated above  Skin: Denies symptoms other than stated above       Current Outpatient Medications:     omeprazole (PRILOSEC) 20 MG delayed release capsule, Take 1 capsule by mouth Daily, Disp: 90 capsule, Rfl: 1    cyclobenzaprine (FLEXERIL) 10 MG tablet, Take 1/2 to 1 tablet every night at bedtime, Disp: 90 tablet, Rfl: 2    aspirin (SB LOW DOSE ASA EC) 81 MG EC tablet, Take by mouth, Disp: , Rfl:     acetaminophen (TYLENOL) 325 MG tablet, Take 650 mg by mouth every 6 hours as needed for Pain, Disp: , Rfl:     vitamin B-12 (CYANOCOBALAMIN) 1000 MCG tablet, Take 1,000 mcg by mouth daily , Disp: , Rfl:     Cholecalciferol (VITAMIN D3) 1000 units CAPS, Take by mouth daily Last dose 6/15/13, Disp: , Rfl:   Allergies   Allergen Reactions    Promethazine-Phenylephrine      RESTLESS LEGS AND Oscar Guitar       Past Medical History:   Diagnosis Date    Abdominal wound dehiscence     TempSrc: Temporal   Weight: 203 lb (92.1 kg)   Height: 5' 2\" (1.575 m)       Focused Lower Extremity Physical Exam:  Vitals:    01/16/20 1540   BP: 130/70   Temp: 97.3 °F (36.3 °C)        Foot Exam    General  General Appearance: appears stated age and healthy   Orientation: alert and oriented to person, place, and time       Right Foot/Ankle     Inspection and Palpation  Tenderness: bony tenderness       Left Foot/Ankle      Inspection and Palpation  Tenderness: bony tenderness and fifth metatarsal base   Swelling: dorsum     Comments  Xr Foot Left (min 3 Views)    Result Date: 1/16/2020  XRAY INTERPREATION Indication: PAIN Examination: 3 VIEWS Narrative: 3 views of the left foot were reviewed there appears to be a small nondisplaced fracture line going through the fifth metatarsal 2 cm distal to the insertion of the peroneus brevis and styloid process  Interpretation: Nondisplaced fracture of the base of the fifth metatarsal    Xr Foot Right (min 3 Views)    Result Date: 1/16/2020  XRAY INTERPREATION Indication: PAIN Examination: 3 VIEWS Narrative: Postsurgical correction noted hardware noted in the navicular no other osseous pathology or fractures noted  Interpretation: Hardware noted no fractures or osseous pathology noted           Left Ankle Exam     Comments:  Xr Foot Left (min 3 Views)    Result Date: 1/16/2020  XRAY INTERPREATION Indication: PAIN Examination: 3 VIEWS Narrative: 3 views of the left foot were reviewed there appears to be a small nondisplaced fracture line going through the fifth metatarsal 2 cm distal to the insertion of the peroneus brevis and styloid process  Interpretation: Nondisplaced fracture of the base of the fifth metatarsal    Xr Foot Right (min 3 Views)    Result Date: 1/16/2020  XRAY INTERPREATION Indication: PAIN Examination: 3 VIEWS Narrative: Postsurgical correction noted hardware noted in the navicular no other osseous pathology or fractures noted  Interpretation: Hardware noted no fractures or osseous pathology noted                Vascular: pulses  dp  pt    Capillary Refill Time:   Hair growth  Skin:    Edema:    Neurologic:      Musculoskeletal/ Orthopedic examination: Pain with palpation to the fifth metatarsal base styloid process left foot the right foot mild pain with palpation along the fifth metatarsal x-rays reviewed for both  NAIL   Web space   Derm:          Assessment and Plan:Cam Walker patient was placed in the cam walker on the left side for 4 weeks limited activities limited limited motion and weightbearing right foot had no fracture noted icing elevate and rest  Signed informed consent was obtained from the patient. Patient applied the cam walker to the affected limb. This device fit well. Patient was given written and verbal instructions regarding this cam walker. This is medically necessary to help reduce pain and promote and expedite healing. Martina Wade was seen today for foot pain and toe pain. Diagnoses and all orders for this visit:    Contusion of right foot, initial encounter  -     XR FOOT RIGHT (MIN 3 VIEWS); Future    Pain in left foot  -     XR FOOT LEFT (MIN 3 VIEWS); Future    Closed fracture of base of fifth metatarsal bone of left foot, initial encounter        Return in about 1 month (around 2/16/2020). Seen By:  Erica Gray DPM      Document was created using voice recognition software. Note was reviewed, however may contain grammatical errors.

## 2020-01-24 ENCOUNTER — HOSPITAL ENCOUNTER (OUTPATIENT)
Age: 65
Discharge: HOME | End: 2020-01-26
Payer: MEDICARE

## 2020-01-24 LAB
ALBUMIN SERPL-MCNC: 4.2 G/DL (ref 3.5–5.2)
ALP BLD-CCNC: 68 U/L (ref 35–104)
ALT SERPL-CCNC: 21 U/L (ref 0–32)
ANION GAP SERPL CALCULATED.3IONS-SCNC: 17 MMOL/L (ref 7–16)
AST SERPL-CCNC: 20 U/L (ref 0–31)
BACTERIA: ABNORMAL /HPF
BASOPHILS ABSOLUTE: 0.07 E9/L (ref 0–0.2)
BASOPHILS RELATIVE PERCENT: 1 % (ref 0–2)
BILIRUB SERPL-MCNC: 0.8 MG/DL (ref 0–1.2)
BILIRUBIN URINE: NEGATIVE
BLOOD, URINE: ABNORMAL
BUN BLDV-MCNC: 13 MG/DL (ref 8–23)
CALCIUM SERPL-MCNC: 9.4 MG/DL (ref 8.6–10.2)
CHLORIDE BLD-SCNC: 101 MMOL/L (ref 98–107)
CHOLESTEROL, TOTAL: 197 MG/DL (ref 0–199)
CLARITY: ABNORMAL
CO2: 23 MMOL/L (ref 22–29)
COLOR: YELLOW
CREAT SERPL-MCNC: 0.8 MG/DL (ref 0.5–1)
CREATININE URINE: 200 MG/DL (ref 29–226)
EOSINOPHILS ABSOLUTE: 0.24 E9/L (ref 0.05–0.5)
EOSINOPHILS RELATIVE PERCENT: 3.3 % (ref 0–6)
GFR AFRICAN AMERICAN: >60
GFR NON-AFRICAN AMERICAN: >60 ML/MIN/1.73
GLUCOSE BLD-MCNC: 114 MG/DL (ref 74–99)
GLUCOSE URINE: NEGATIVE MG/DL
HBA1C MFR BLD: 5.6 % (ref 4–5.6)
HCT VFR BLD CALC: 51.6 % (ref 34–48)
HDLC SERPL-MCNC: 43 MG/DL
HEMOGLOBIN: 15.7 G/DL (ref 11.5–15.5)
IMMATURE GRANULOCYTES #: 0.01 E9/L
IMMATURE GRANULOCYTES %: 0.1 % (ref 0–5)
KETONES, URINE: 15 MG/DL
LDL CHOLESTEROL CALCULATED: 129 MG/DL (ref 0–99)
LEUKOCYTE ESTERASE, URINE: ABNORMAL
LYMPHOCYTES ABSOLUTE: 1.92 E9/L (ref 1.5–4)
LYMPHOCYTES RELATIVE PERCENT: 26.4 % (ref 20–42)
MCH RBC QN AUTO: 27.9 PG (ref 26–35)
MCHC RBC AUTO-ENTMCNC: 30.4 % (ref 32–34.5)
MCV RBC AUTO: 91.8 FL (ref 80–99.9)
MICROALBUMIN UR-MCNC: 21.9 MG/L
MICROALBUMIN/CREAT UR-RTO: 11 (ref 0–30)
MONOCYTES ABSOLUTE: 0.57 E9/L (ref 0.1–0.95)
MONOCYTES RELATIVE PERCENT: 7.8 % (ref 2–12)
NEUTROPHILS ABSOLUTE: 4.47 E9/L (ref 1.8–7.3)
NEUTROPHILS RELATIVE PERCENT: 61.4 % (ref 43–80)
NITRITE, URINE: NEGATIVE
PDW BLD-RTO: 13.9 FL (ref 11.5–15)
PH UA: 5.5 (ref 5–9)
PLATELET # BLD: 279 E9/L (ref 130–450)
PMV BLD AUTO: 11.5 FL (ref 7–12)
POTASSIUM SERPL-SCNC: 4 MMOL/L (ref 3.5–5)
PROTEIN UA: NEGATIVE MG/DL
RBC # BLD: 5.62 E12/L (ref 3.5–5.5)
RBC UA: ABNORMAL /HPF (ref 0–2)
SODIUM BLD-SCNC: 141 MMOL/L (ref 132–146)
SPECIFIC GRAVITY UA: 1.02 (ref 1–1.03)
TOTAL PROTEIN: 6.8 G/DL (ref 6.4–8.3)
TRIGL SERPL-MCNC: 123 MG/DL (ref 0–149)
UROBILINOGEN, URINE: 0.2 E.U./DL
VITAMIN D 25-HYDROXY: 19 NG/ML (ref 30–100)
VLDLC SERPL CALC-MCNC: 25 MG/DL
WBC # BLD: 7.3 E9/L (ref 4.5–11.5)
WBC UA: ABNORMAL /HPF (ref 0–5)

## 2020-01-24 PROCEDURE — 81001 URINALYSIS AUTO W/SCOPE: CPT

## 2020-01-24 PROCEDURE — 85025 COMPLETE CBC W/AUTO DIFF WBC: CPT

## 2020-01-24 PROCEDURE — 82306 VITAMIN D 25 HYDROXY: CPT

## 2020-01-24 PROCEDURE — 80061 LIPID PANEL: CPT

## 2020-01-24 PROCEDURE — 82570 ASSAY OF URINE CREATININE: CPT

## 2020-01-24 PROCEDURE — 83036 HEMOGLOBIN GLYCOSYLATED A1C: CPT

## 2020-01-24 PROCEDURE — 36415 COLL VENOUS BLD VENIPUNCTURE: CPT

## 2020-01-24 PROCEDURE — 82044 UR ALBUMIN SEMIQUANTITATIVE: CPT

## 2020-01-24 PROCEDURE — 80053 COMPREHEN METABOLIC PANEL: CPT

## 2020-02-04 ENCOUNTER — OFFICE VISIT (OUTPATIENT)
Dept: PODIATRY | Age: 65
End: 2020-02-04
Payer: MEDICARE

## 2020-02-04 VITALS
HEIGHT: 61 IN | DIASTOLIC BLOOD PRESSURE: 72 MMHG | WEIGHT: 196 LBS | BODY MASS INDEX: 37 KG/M2 | TEMPERATURE: 98.2 F | SYSTOLIC BLOOD PRESSURE: 124 MMHG

## 2020-02-04 PROCEDURE — G8482 FLU IMMUNIZE ORDER/ADMIN: HCPCS | Performed by: PODIATRIST

## 2020-02-04 PROCEDURE — G9899 SCRN MAM PERF RSLTS DOC: HCPCS | Performed by: PODIATRIST

## 2020-02-04 PROCEDURE — G8427 DOCREV CUR MEDS BY ELIG CLIN: HCPCS | Performed by: PODIATRIST

## 2020-02-04 PROCEDURE — G8400 PT W/DXA NO RESULTS DOC: HCPCS | Performed by: PODIATRIST

## 2020-02-04 PROCEDURE — 4040F PNEUMOC VAC/ADMIN/RCVD: CPT | Performed by: PODIATRIST

## 2020-02-04 PROCEDURE — 3017F COLORECTAL CA SCREEN DOC REV: CPT | Performed by: PODIATRIST

## 2020-02-04 PROCEDURE — 1090F PRES/ABSN URINE INCON ASSESS: CPT | Performed by: PODIATRIST

## 2020-02-04 PROCEDURE — G8417 CALC BMI ABV UP PARAM F/U: HCPCS | Performed by: PODIATRIST

## 2020-02-04 PROCEDURE — 99213 OFFICE O/P EST LOW 20 MIN: CPT | Performed by: PODIATRIST

## 2020-02-04 PROCEDURE — 29580 STRAPPING UNNA BOOT: CPT | Performed by: PODIATRIST

## 2020-02-04 PROCEDURE — 1123F ACP DISCUSS/DSCN MKR DOCD: CPT | Performed by: PODIATRIST

## 2020-02-04 PROCEDURE — 1036F TOBACCO NON-USER: CPT | Performed by: PODIATRIST

## 2020-02-04 NOTE — PROGRESS NOTES
20  Shyam Lorenz : 1955 Sex: female  Age: 75 y.o. Patient was referred by Ping Snider MD    Chief Complaint   Patient presents with    Foot Injury     fx right foot in cam walker since 2020 sent for new xrays today        SUBJECTIVE patient is seen for follow-up of a left foot fracture patient has been in the cam walker for about 2 weeks now feeling slightly improved  Toe Pain      Foot Injury    The incident occurred more than 1 week ago. The incident occurred at home. The injury mechanism was a twisting injury. The pain is present in the left foot. The pain is at a severity of 3/10. The pain is mild. The pain has been improving since onset.      Review of Systems  Const: Denies constitutional symptoms  Musculo: Denies symptoms other than stated above  Skin: Denies symptoms other than stated above       Current Outpatient Medications:     omeprazole (PRILOSEC) 20 MG delayed release capsule, Take 1 capsule by mouth Daily, Disp: 90 capsule, Rfl: 1    cyclobenzaprine (FLEXERIL) 10 MG tablet, Take 1/2 to 1 tablet every night at bedtime, Disp: 90 tablet, Rfl: 2    aspirin (SB LOW DOSE ASA EC) 81 MG EC tablet, Take by mouth, Disp: , Rfl:     acetaminophen (TYLENOL) 325 MG tablet, Take 650 mg by mouth every 6 hours as needed for Pain, Disp: , Rfl:     vitamin B-12 (CYANOCOBALAMIN) 1000 MCG tablet, Take 1,000 mcg by mouth daily , Disp: , Rfl:     Cholecalciferol (VITAMIN D3) 1000 units CAPS, Take by mouth daily Last dose 6/15/13, Disp: , Rfl:   Allergies   Allergen Reactions    Promethazine-Phenylephrine      RESTLESS LEGS AND EMISIS        PHENARGAN       Past Medical History:   Diagnosis Date    Abdominal wound dehiscence     Anxiety     Arthritis     OSTEO    Depression     STABLE WITH MEDS PER PT    Fibromyalgia     Hyperlipidemia     Impaired fasting glucose     Lumbar spinal stenosis     Mild intermittent asthma     Obesity     Osteopenia     Pulmonary nodules     Sleep apnea     BI PAP      MACHINE PRE SET, PT DOES NOT KNOW SETTINGS     Past Surgical History:   Procedure Laterality Date    ABDOMEN SURGERY      ANKLE SURGERY      X2    HERNIA REPAIR      HYSTERECTOMY      LIPECTOMY      TONSILLECTOMY       No family history on file.   Social History     Socioeconomic History    Marital status:      Spouse name: Not on file    Number of children: Not on file    Years of education: Not on file    Highest education level: Not on file   Occupational History    Not on file   Social Needs    Financial resource strain: Not on file    Food insecurity:     Worry: Not on file     Inability: Not on file    Transportation needs:     Medical: Not on file     Non-medical: Not on file   Tobacco Use    Smoking status: Never Smoker    Smokeless tobacco: Never Used   Substance and Sexual Activity    Alcohol use: No    Drug use: No    Sexual activity: Not on file   Lifestyle    Physical activity:     Days per week: Not on file     Minutes per session: Not on file    Stress: Not on file   Relationships    Social connections:     Talks on phone: Not on file     Gets together: Not on file     Attends [employer] service: Not on file     Active member of club or organization: Not on file     Attends meetings of clubs or organizations: Not on file     Relationship status: Not on file    Intimate partner violence:     Fear of current or ex partner: Not on file     Emotionally abused: Not on file     Physically abused: Not on file     Forced sexual activity: Not on file   Other Topics Concern    Not on file   Social History Narrative    Not on file       Vitals:    02/04/20 1409   BP: 124/72   Temp: 98.2 °F (36.8 °C)   TempSrc: Temporal   Weight: 196 lb (88.9 kg)  Comment: cam walker   Height: 5' 1\" (1.549 m)       Focused Lower Extremity Physical Exam:  Vitals:    02/04/20 1409   BP: 124/72   Temp: 98.2 °F (36.8 °C)        Foot Exam    General  General Appearance: appears fracture appears to be healed  Interpretation: Healed fifth metatarsal fracture    Xr Foot Left (min 3 Views)    Result Date: 2020  XRAY INTERPREATION Indication: PAIN Examination: 3 VIEWS Narrative: 3 views of the left foot were reviewed there appears to be a small nondisplaced fracture line going through the fifth metatarsal 2 cm distal to the insertion of the peroneus brevis and styloid process  Interpretation: Nondisplaced fracture of the base of the fifth metatarsal    Xr Foot Right (min 3 Views)    Result Date: 2020  XRAY INTERPREATION Indication: PAIN Examination: 3 VIEWS Narrative: Postsurgical correction noted hardware noted in the navicular no other osseous pathology or fractures noted  Interpretation: Hardware noted no fractures or osseous pathology noted    Xr Chest Portable    Result Date: 2020                                      [address]                                              [address]                                              [phone]                                                XRay Report                                                  Signed    [facility]                                                                MR#: [id number]          : 1955                                                                [de-identified]            Age/Sex: 75 / F                                                                Admit Date: 20            Loc: ER                                                                                     Attending Dr:     Ange Thomas Physician: Romain Christiansen MD   Date of Service: 20  Procedure(s): XR chest 1V portable  Accession Number(s): [id number]    cc:       HISTORY:  Chest pain       Comparison:  August 10, 2018. PHYSICIAN INDICATIONS:  chest pain       TECHNIQUE:  XR chest 1V portable.        FINDINGS: The heart size is normal.  The lung fields are clear. No infiltrates or effusions are   identified. Moderate hiatal hernia. IMPRESSION XR chest 1V portable:   No acute cardiopulmonary disease. Moderate hiatal hernia. Dictated By: Miguel Angel Tovar DO    DD/DT: 20               Signed By: Miguel Angel Tovar DO 20              : KARSTEN    Cta Pulmonary W Contrast    Result Date: 2020                                      [address]                                             [facility], [address] [place], [place] [address]                                              [phone]                                               CT Scan Report                                                  Signed    Patient: Allison Wynn                                                                MR#: [id number]          : 1955                                                                [de-identified]            Age/Sex: 75 / F                                                                Admit Date: 20            Loc: ER                                                                                     Attending Dr:     Ordering Physician: Shagufta Miller MD   Date of Service: 20  Procedure(s): CT angio chest PE protocol  Accession Number(s): [id number]    cc: HISTORY:  The anterior chest pain, elevated D-dimer, concern for pulmonary embolus. Comparison:  2019. PHYSICIAN INDICATIONS:  r/o pe       TECHNIQUE:  Axial images were obtained from the chest with dynamic injection of contrast and thin   cut images to evaluate the pulmonary arteries. Coronal reconstructions and 3D imaging was   performed. CT Dose Index: 3.7 - 7.6 mGy. DLP: 251 mGy-cm. Administered 67.0 ml of ISOVUE 300.00   mg/ml.   AEC.  k=0.014mSv/(mGy*cm)       FINDINGS:  Pulmonary arteries appear MD   DD/DT: 01/27/20 1151               Signed By: Aguila Waddell MD 01/27/20 1151            :      Vascular: pulses  dp  pt    Capillary Refill Time:   Hair growth  Skin:    Edema:    Neurologic:      Musculoskeletal/ Orthopedic examination: Pain with palpation to the fifth metatarsal base styloid process left foot the right foot mild pain with palpation along the fifth metatarsal x-rays reviewed for both  NAIL   Web space   Derm:          Assessment and Plan: Today's treatment plan is in Unna boot wrapping    An unna boot  compressive wrap was applied to the left lower extremity. It's purpose is to  decrease  the amount of edema present, and to allow proper healing of the soft tissues. The patient was instructed to keep the unna boot clean, dry and intact until the next follow up visit. The patient was instructed to look for signs of redness, irritation, blistering and pain. If these or any other symptoms were to develop, they were advised to contact the office immediately for reevaluation. It is to stay in the cam walker for 4-5 more weeks in a boot is to be removed after 4 days reevaluate in 2 weeks  Gerardine Schirmer was seen today for foot injury. Diagnoses and all orders for this visit:    Closed fracture of left foot with routine healing, subsequent encounter  -     XR FOOT LEFT (MIN 3 VIEWS); Future    Pain in left foot        Return in about 2 weeks (around 2/18/2020). Seen By:  Tamara Talley DPM      Document was created using voice recognition software. Note was reviewed, however may contain grammatical errors.

## 2020-02-10 ENCOUNTER — OFFICE VISIT (OUTPATIENT)
Dept: FAMILY MEDICINE CLINIC | Age: 65
End: 2020-02-10
Payer: MEDICARE

## 2020-02-10 VITALS
BODY MASS INDEX: 38.09 KG/M2 | HEART RATE: 97 BPM | OXYGEN SATURATION: 97 % | SYSTOLIC BLOOD PRESSURE: 130 MMHG | DIASTOLIC BLOOD PRESSURE: 78 MMHG | WEIGHT: 194 LBS | HEIGHT: 60 IN

## 2020-02-10 VITALS
HEIGHT: 60 IN | OXYGEN SATURATION: 97 % | BODY MASS INDEX: 38.09 KG/M2 | WEIGHT: 194 LBS | DIASTOLIC BLOOD PRESSURE: 78 MMHG | HEART RATE: 91 BPM | TEMPERATURE: 98.4 F | SYSTOLIC BLOOD PRESSURE: 130 MMHG

## 2020-02-10 PROBLEM — S92.355A CLOSED NONDISPLACED FRACTURE OF FIFTH LEFT METATARSAL BONE: Status: ACTIVE | Noted: 2020-02-10

## 2020-02-10 PROCEDURE — G8400 PT W/DXA NO RESULTS DOC: HCPCS | Performed by: FAMILY MEDICINE

## 2020-02-10 PROCEDURE — G0438 PPPS, INITIAL VISIT: HCPCS | Performed by: PHYSICIAN ASSISTANT

## 2020-02-10 PROCEDURE — 3017F COLORECTAL CA SCREEN DOC REV: CPT | Performed by: PHYSICIAN ASSISTANT

## 2020-02-10 PROCEDURE — 4040F PNEUMOC VAC/ADMIN/RCVD: CPT | Performed by: PHYSICIAN ASSISTANT

## 2020-02-10 PROCEDURE — 1123F ACP DISCUSS/DSCN MKR DOCD: CPT | Performed by: FAMILY MEDICINE

## 2020-02-10 PROCEDURE — 1090F PRES/ABSN URINE INCON ASSESS: CPT | Performed by: FAMILY MEDICINE

## 2020-02-10 PROCEDURE — G9899 SCRN MAM PERF RSLTS DOC: HCPCS | Performed by: FAMILY MEDICINE

## 2020-02-10 PROCEDURE — 3017F COLORECTAL CA SCREEN DOC REV: CPT | Performed by: FAMILY MEDICINE

## 2020-02-10 PROCEDURE — 99215 OFFICE O/P EST HI 40 MIN: CPT | Performed by: FAMILY MEDICINE

## 2020-02-10 PROCEDURE — G8482 FLU IMMUNIZE ORDER/ADMIN: HCPCS | Performed by: PHYSICIAN ASSISTANT

## 2020-02-10 PROCEDURE — 1036F TOBACCO NON-USER: CPT | Performed by: FAMILY MEDICINE

## 2020-02-10 PROCEDURE — G8417 CALC BMI ABV UP PARAM F/U: HCPCS | Performed by: FAMILY MEDICINE

## 2020-02-10 PROCEDURE — 1123F ACP DISCUSS/DSCN MKR DOCD: CPT | Performed by: PHYSICIAN ASSISTANT

## 2020-02-10 PROCEDURE — 4040F PNEUMOC VAC/ADMIN/RCVD: CPT | Performed by: FAMILY MEDICINE

## 2020-02-10 PROCEDURE — G8482 FLU IMMUNIZE ORDER/ADMIN: HCPCS | Performed by: FAMILY MEDICINE

## 2020-02-10 PROCEDURE — G8427 DOCREV CUR MEDS BY ELIG CLIN: HCPCS | Performed by: FAMILY MEDICINE

## 2020-02-10 RX ORDER — OMEPRAZOLE 20 MG/1
20 CAPSULE, DELAYED RELEASE ORAL DAILY
Qty: 90 CAPSULE | Refills: 1 | Status: SHIPPED | OUTPATIENT
Start: 2020-02-10 | End: 2020-08-03 | Stop reason: SDUPTHER

## 2020-02-10 RX ORDER — CYCLOBENZAPRINE HCL 10 MG
TABLET ORAL
Qty: 90 TABLET | Refills: 2 | Status: SHIPPED | OUTPATIENT
Start: 2020-02-10 | End: 2020-08-03 | Stop reason: SDUPTHER

## 2020-02-10 ASSESSMENT — ENCOUNTER SYMPTOMS
EYE PAIN: 0
SHORTNESS OF BREATH: 0
SINUS PRESSURE: 0
BACK PAIN: 1
SORE THROAT: 0
SINUS PAIN: 0
ABDOMINAL DISTENTION: 0
VOMITING: 0
CHEST TIGHTNESS: 0
BLOOD IN STOOL: 0
NAUSEA: 0
EYE REDNESS: 0
COLOR CHANGE: 0
ABDOMINAL PAIN: 0
COUGH: 0
PHOTOPHOBIA: 0
EYE ITCHING: 0
CONSTIPATION: 0
WHEEZING: 0
DIARRHEA: 0

## 2020-02-10 ASSESSMENT — PATIENT HEALTH QUESTIONNAIRE - PHQ9
SUM OF ALL RESPONSES TO PHQ QUESTIONS 1-9: 0
SUM OF ALL RESPONSES TO PHQ QUESTIONS 1-9: 0

## 2020-02-10 ASSESSMENT — LIFESTYLE VARIABLES: HOW OFTEN DO YOU HAVE A DRINK CONTAINING ALCOHOL: 0

## 2020-02-10 NOTE — PROGRESS NOTES
Relevant Medications    aspirin (SB LOW DOSE ASA EC) 81 MG EC tablet    Other Relevant Orders    CBC Auto Differential    Comprehensive Metabolic Panel    Lipid Panel    Anxiety    Relevant Orders    CBC Auto Differential    Fibromyalgia    Relevant Medications    cyclobenzaprine (FLEXERIL) 10 MG tablet    Lumbar spinal stenosis    Pulmonary nodules    Obesity      reviewed recent bw results   utd colonoscopy 7/2017, recheck in 5 years  Reviewed mammogram from 2/2020  Reviewed dexa scan from 4/2019--improvement noted in bone density  Reviewed er reports and nm stress test  Cont with weight loss endeavor  Cont to follow with dr. Yen Royalty to follow with dr. Jose Bailey to follow with dr. Jennifer Reed foot xray  Will be seen later today for AWV    Return in about 6 months (around 8/10/2020) for fasting bw prior.       Orders Placed This Encounter   Procedures    CBC Auto Differential     Standing Status:   Future     Standing Expiration Date:   2/10/2021    Comprehensive Metabolic Panel     Standing Status:   Future     Standing Expiration Date:   2/10/2021    Lipid Panel     Standing Status:   Future     Standing Expiration Date:   2/10/2021     Order Specific Question:   Is Patient Fasting?/# of Hours     Answer:   12    Urinalysis     Standing Status:   Future     Standing Expiration Date:   2/10/2021    Microalbumin / Creatinine Urine Ratio     Standing Status:   Future     Standing Expiration Date:   2/10/2021    Hemoglobin A1C     Standing Status:   Future     Standing Expiration Date:   2/10/2021    Vitamin D 25 Hydroxy     Standing Status:   Future     Standing Expiration Date:   2/10/2021       Joe Hitchcock MD  2/10/2020  2:36 PM

## 2020-02-10 NOTE — PROGRESS NOTES
Medicare Annual Wellness Visit  Name: Praveen Galicia Date: 2/10/2020   MRN: [id number] Sex: Female   Age: 75 y.o. Ethnicity: Non-/Non    : 1955 Race: Lana Griffith is here for Medicare AWV    She lives with her  and mother in law (temporary situation), they could call for help if shew ere to fall  She carries a cell to call for help if needed  The living situation has her stressed, will be moving out soon  She fell on the ice and fractured her foot, hs some pain from that  Denies depression. PHQ2=0 today. Patient is safe in home and car. Is independent with ADLs and home management  Dental and Eye exams are due  She has a Living Will  She had mammogram done last month  She is due for DEXA and colonoscopy, referral will be placed for colonoscopy with Dr. Vásquez Dural  She had Prevnar 13 10/7/19, will be due for Pneumovax 23 after 10/7/2020  She had Flu vac 10/2019  She has not had Shingrix, tetanus is due      Screenings for behavioral, psychosocial and functional/safety risks, and cognitive dysfunction are all negative except as indicated below. These results, as well as other patient data from the [address] form, are documented in Flowsheets linked to this Encounter. Allergies   Allergen Reactions    Promethazine-Phenylephrine      RESTLESS LEGS AND [place]       Prior to Visit Medications    Medication Sig Taking?  Authorizing Provider   cyclobenzaprine (FLEXERIL) 10 MG tablet Take 1/2 to 1 tablet every night at bedtime Yes Annalee Ruiz MD   omeprazole (PRILOSEC) 20 MG delayed release capsule Take 1 capsule by mouth Daily Yes Annalee Ruiz MD   aspirin (SB LOW DOSE ASA EC) 81 MG EC tablet Take by mouth Yes Historical Provider, MD   acetaminophen (TYLENOL) 325 MG tablet Take 650 mg by mouth every 6 hours as needed for Pain Yes Historical Provider, MD   vitamin B-12 (CYANOCOBALAMIN) 1000 MCG tablet Take 1,000 mcg by mouth daily  Yes kg/m². Based upon direct observation of the patient, evaluation of cognition reveals recent and remote memory intact. Resp:  Respirations are regular and unlabored. Respirations rate is normal Lungs are clear bilaterally  Cv: Rate is regular. Rhythm is regular. S1 is normal. S2 is normal.  Systolic murmur noted. Extremities: No edema of the BLE; LLE in surgical boot  Musculoskeletal: Walks with an age appropriate gait. Timed UP & Go test < 10 secs. Neuro: Displays comfort and cooperation during the encounter. Affect is normal.  Alert and Oriented x3. Able to name current President. Was able to recall 3 objects with accuracy. Score for Clock drawing = 2      Patient's complete Health Risk Assessment and screening values have been reviewed and are found in Flowsheets. The following problems were reviewed today and where indicated follow up appointments were made and/or referrals ordered. Positive Risk Factor Screenings with Interventions:     Fall Risk:  Timed Up and Go Test > 12 seconds? (Complete if either Fall Risk answers are Yes): no  2 or more falls in past year?: no  Fall with injury in past year?: (!) yes  Fall Risk Interventions:    · Home safety tips provided    General Health:  General  In general, how would you say your health is?: Good  In the past 7 days, have you experienced any of the following? New or Increased Pain, New or Increased Fatigue, Loneliness, Social Isolation, Stress or Anger?: (!) New or Increased Pain, Stress  Do you get the social and emotional support that you need?: Yes  Do you have a Living Will?: Yes  General Health Risk Interventions:  · see HPI    Health Habits/Nutrition:  Health Habits/Nutrition  Do you exercise for at least 20 minutes 2-3 times per week?: Yes  Have you lost any weight without trying in the past 3 months?: No  Do you eat fewer than 2 meals per day?: No  Have you seen a dentist within the past year?: (!) No  Body mass index is 37.89 kg/m².   Health

## 2020-02-18 ENCOUNTER — OFFICE VISIT (OUTPATIENT)
Dept: PODIATRY | Age: 65
End: 2020-02-18
Payer: MEDICARE

## 2020-02-18 VITALS
BODY MASS INDEX: 38.09 KG/M2 | TEMPERATURE: 97.6 F | DIASTOLIC BLOOD PRESSURE: 83 MMHG | HEIGHT: 60 IN | WEIGHT: 194 LBS | SYSTOLIC BLOOD PRESSURE: 123 MMHG

## 2020-02-18 PROCEDURE — G9899 SCRN MAM PERF RSLTS DOC: HCPCS | Performed by: PODIATRIST

## 2020-02-18 PROCEDURE — 1123F ACP DISCUSS/DSCN MKR DOCD: CPT | Performed by: PODIATRIST

## 2020-02-18 PROCEDURE — 4040F PNEUMOC VAC/ADMIN/RCVD: CPT | Performed by: PODIATRIST

## 2020-02-18 PROCEDURE — G8482 FLU IMMUNIZE ORDER/ADMIN: HCPCS | Performed by: PODIATRIST

## 2020-02-18 PROCEDURE — G8427 DOCREV CUR MEDS BY ELIG CLIN: HCPCS | Performed by: PODIATRIST

## 2020-02-18 PROCEDURE — 3017F COLORECTAL CA SCREEN DOC REV: CPT | Performed by: PODIATRIST

## 2020-02-18 PROCEDURE — G8417 CALC BMI ABV UP PARAM F/U: HCPCS | Performed by: PODIATRIST

## 2020-02-18 PROCEDURE — 1036F TOBACCO NON-USER: CPT | Performed by: PODIATRIST

## 2020-02-18 PROCEDURE — 1090F PRES/ABSN URINE INCON ASSESS: CPT | Performed by: PODIATRIST

## 2020-02-18 PROCEDURE — G8400 PT W/DXA NO RESULTS DOC: HCPCS | Performed by: PODIATRIST

## 2020-02-18 PROCEDURE — 99212 OFFICE O/P EST SF 10 MIN: CPT | Performed by: PODIATRIST

## 2020-02-18 NOTE — PROGRESS NOTES
20  [facility] : 1955 Sex: female  Age: 75 y.o. Patient was referred by Tiffani Vazquez MD    Chief Complaint   Patient presents with    Foot Injury     left foot fx has been in cam walker since 2020       SUBJECTIVE patient is seen for follow-up of a left foot fracture patient has been in the cam walker for about 4 weeks  now feeling  improved  Foot Injury    The incident occurred more than 1 week ago. The incident occurred at home. The injury mechanism was a twisting injury. The pain is present in the left foot. The pain is at a severity of 3/10. The pain is mild. The pain has been improving since onset.    Toe Pain        Review of Systems  Const: Denies constitutional symptoms  Musculo: Denies symptoms other than stated above  Skin: Denies symptoms other than stated above       Current Outpatient Medications:     cyclobenzaprine (FLEXERIL) 10 MG tablet, Take 1/2 to 1 tablet every night at bedtime, Disp: 90 tablet, Rfl: 2    omeprazole (PRILOSEC) 20 MG delayed release capsule, Take 1 capsule by mouth Daily, Disp: 90 capsule, Rfl: 1    aspirin (SB LOW DOSE ASA EC) 81 MG EC tablet, Take by mouth, Disp: , Rfl:     acetaminophen (TYLENOL) 325 MG tablet, Take 650 mg by mouth every 6 hours as needed for Pain, Disp: , Rfl:     vitamin B-12 (CYANOCOBALAMIN) 1000 MCG tablet, Take 1,000 mcg by mouth daily , Disp: , Rfl:     Cholecalciferol (VITAMIN D3) 1000 units CAPS, Take 2,000 Units by mouth daily Last dose 6/15/13, Disp: , Rfl:   Allergies   Allergen Reactions    Promethazine-Phenylephrine      RESTLESS LEGS AND EMISIS        PHENARGAN       Past Medical History:   Diagnosis Date    Abdominal wound dehiscence     Anxiety     Arthritis     OSTEO    Depression     STABLE WITH MEDS PER PT    Fibromyalgia     History of colon polyps     Hyperlipidemia     Impaired fasting glucose     Lumbar spinal stenosis     Mild intermittent asthma     Obesity     Osteopenia     Pulmonary nodules     Sleep apnea     BI PAP      MACHINE PRE SET, PT DOES NOT KNOW SETTINGS    Vitamin B12 deficiency     Vitamin D deficiency      Past Surgical History:   Procedure Laterality Date    ABDOMEN SURGERY  04/2013    panniculectomy with abdominoplasty    ANKLE SURGERY Bilateral     X2    BREAST REDUCTION SURGERY Bilateral     BREAST SURGERY Left 06/2013    needle lumpectomy, fibrocystic changes    CHOLECYSTECTOMY, LAPAROSCOPIC      HERNIA REPAIR      x2, Sankovic  x1, Generalovich    HYSTERECTOMY      LIPECTOMY      RECTAL PROLAPSE REPAIR      TONSILLECTOMY       Family History   Problem Relation Age of Onset    Breast Cancer Mother     Diabetes type 2  Father     Other Other         no siblings     Social History     Socioeconomic History    Marital status:      Spouse name: Not on file    Number of children: Not on file    Years of education: Not on file    Highest education level: Not on file   Occupational History    Occupation: retired [employer] strain: Not on file    Food insecurity:     Worry: Not on file     Inability: Not on file   [employer] needs:     Medical: Not on file     Non-medical: Not on file   Tobacco Use    Smoking status: Never Smoker    Smokeless tobacco: Never Used   Substance and Sexual Activity    Alcohol use: No    Drug use: No    Sexual activity: Not on file   Lifestyle    Physical activity:     Days per week: Not on file     Minutes per session: Not on file    Stress: Not on file   Relationships    Social connections:     Talks on phone: Not on file     Gets together: Not on file     Attends Sabianism service: Not on file     Active member of club or organization: Not on file     Attends meetings of clubs or organizations: Not on file     Relationship status: Not on file    Intimate partner violence:     Fear of current or ex partner: Not on file     Emotionally abused: Not on file Physically abused: Not on file     Forced sexual activity: Not on file   Other Topics Concern    Not on file   Social History Narrative    Not on file       Vitals:    02/18/20 1314   BP: 123/83   Temp: 97.6 °F (36.4 °C)   TempSrc: Temporal   Weight: 194 lb (88 kg)  Comment: cam walker   Height: 5' (1.524 m)       Focused Lower Extremity Physical Exam:  Vitals:    02/18/20 1314   BP: 123/83   Temp: 97.6 °F (36.4 °C)        Foot Exam    General  General Appearance: appears stated age and healthy   Orientation: alert and oriented to person, place, and time       Right Foot/Ankle     Inspection and Palpation  Tenderness: none   Skin Exam: skin intact; Left Foot/Ankle      Inspection and Palpation  Tenderness: none   Swelling: dorsum   Skin Exam: skin intact;      Comments  Xr Foot Left (min 3 Views)    Result Date: 1/16/2020  XRAY INTERPREATION Indication: PAIN Examination: 3 VIEWS Narrative: 3 views of the left foot were reviewed there appears to be a small nondisplaced fracture line going through the fifth metatarsal 2 cm distal to the insertion of the peroneus brevis and styloid process  Interpretation: Nondisplaced fracture of the base of the fifth metatarsal    Xr Foot Right (min 3 Views)    Result Date: 1/16/2020  XRAY INTERPREATION Indication: PAIN Examination: 3 VIEWS Narrative: Postsurgical correction noted hardware noted in the navicular no other osseous pathology or fractures noted  Interpretation: Hardware noted no fractures or osseous pathology noted           Left Ankle Exam     Comments:  Xr Foot Left (min 3 Views)    Result Date: 1/16/2020  XRAY INTERPREATION Indication: PAIN Examination: 3 VIEWS Narrative: 3 views of the left foot were reviewed there appears to be a small nondisplaced fracture line going through the fifth metatarsal 2 cm distal to the insertion of the peroneus brevis and styloid process  Interpretation: Nondisplaced fracture of the base of the fifth metatarsal    Xr Foot Right (min 3 Views)    Result Date: 1/16/2020  XRAY INTERPREATION Indication: PAIN Examination: 3 VIEWS Narrative: Postsurgical correction noted hardware noted in the navicular no other osseous pathology or fractures noted  Interpretation: Hardware noted no fractures or osseous pathology noted            Xr Foot Left (min 3 Views)    Result Date: 2/4/2020  XRAY INTERPREATION Indication: PAIN Examination: 3 VIEWS Narrative: 3 views of the left foot were reviewed the fifth metatarsal fracture appears to be healed  Interpretation: Healed fifth metatarsal fracture    Xr Foot Left (min 3 Views)    Result Date: 1/16/2020  XRAY INTERPREATION Indication: PAIN Examination: 3 VIEWS Narrative: 3 views of the left foot were reviewed there appears to be a small nondisplaced fracture line going through the fifth metatarsal 2 cm distal to the insertion of the peroneus brevis and styloid process  Interpretation: Nondisplaced fracture of the base of the fifth metatarsal    Xr Foot Right (min 3 Views)    Result Date: 1/16/2020  XRAY INTERPREATION Indication: PAIN Examination: 3 VIEWS Narrative: Postsurgical correction noted hardware noted in the navicular no other osseous pathology or fractures noted  Interpretation: Hardware noted no fractures or osseous pathology noted    Xr Foot Left (min 3 Views)    Result Date: 2/18/2020  XRAY INTERPREATION Indication: PAIN Examination: 3 VIEWS Narrative:  The fifth met fracture appears to be healed all internal hardware is in place with no loosening  Interpretation: Healed fifth metatarsal fracture    Xr Foot Left (min 3 Views)    Result Date: 2/4/2020  XRAY INTERPREATION Indication: PAIN Examination: 3 VIEWS Narrative: 3 views of the left foot were reviewed the fifth metatarsal fracture appears to be healed  Interpretation: Healed fifth metatarsal fracture    Xr Chest Portable    Result Date: 1/26/2020                                      [facility] level of the subsegmental arteries. 2.  Lung fields are clear. Pulmonary nodules in the right upper lung appear visually stable from   2019. 3.  Moderate-to-large hiatal hernia. 4.  Bilateral adrenal adenomas are visually stable from 2019. Fleischner Society Criteria for Incidental Lung Nodules:   Solid Nodules:   <=6mm CT at 12 months. 6-8mm CT at 6-12 months, then 18-24 months. > 8mm Biopsy, PET/CT, or CT at 3, 9, and 24 months. Ground Glass and Partially Solid Nodules:   Single SubSolid Nodule:   >6mm should be followed at 6 month intervals for 5 years. Multiple SubSolid nodules   <= 6mm  CT at 3-6 months and 2 years and 4 years. >6mm    CT at 3-6 months then follow up CTs based on the most suspicious nodule(s).                Dictated By: Felipe Drake DO    DD/DT: 20               Signed By: Felipe Drake DO 20              : KARSTEN Manuel Myocardial Spect Rest Exercise Or Rx    Result Date: 2020                                      [address] [place] [address]                                              [phone]                                          Nuclear Medicine Report                                                  Signed    Patient: Marianne Liriano                                                                MR#: [id number]          : 1955                                                                [de-identified]            Age/Sex: 75 / F                                                                Admit Date: 20            Loc: 3T             3029-1                                                                  Attending Dr: Emeli Claros MD    Ordering Physician: Lianet Mercedes MD   Date of Service: 20  Procedure(s): NM myocardial perf multi pharm  Accession Number(s): [id number]    cc: History:  Chest pain. Chest heaviness midsternal.  Shortness of breath. Asthma. Family history   of heart disease. Sweating. Heart murmur. Technique:  Cardiac SPECT images with 10 mCi Tc-99m Sestamibi at Rest and 30 mCi at Stress. The   stress method was Lexiscan. A gated cardiac SPECT examination was performed following the stress   procedure. Findings: The stress and rest images appear normal with no evidence of defects to suggest infarct   or ischemia. The myocardial wall motion appears normal on the gated images. The left ventricular   ejection fraction measures 63%. Impression:   1. There is no evidence of infarct or ischemia during the examination. 2.  Normal myocardial wall motion is seen on the gated examination. 3.  The left ventricular ejection fraction measures 63% which is normal.       These findings are unchanged when compared to prior exam of 19.            Dictated ByCoral Patrick MD   DD/DT: 20 1151               Signed By: Aura Lakhani MD 20 1151            :    [place] Socks Digital Screen Bilateral    Result Date: 2020                                      [address], [address] [place] [address] [address]kw[address]                                              [phone]                                             Mammography Report                                                  Signed    Patient: [facility]                                                                MR#: [id number]          : 1955                                                                [de-identified]            Age/Sex: 75 / F                                                                Admit Date: 20            Loc: [facility] Attending Dr: Sussy Jauregui MD    Ordering Physician: Yamileth Munguia MD   Date of Service: 02/04/20  Procedure(s): MM tomosynthesis screening BI  Accession Number(s): [id number]    cc:       HISTORY:   Last mammogram was performed 1 year and 1 month ago. Reason for exam: screening, asymptomatic. MM Tomosynthesis Screening BI       TECHNIQUE:   Bilateral 3D CC and 3D MLO view(s) were taken. FINDINGS:   Prior study comparison: December 28, 2018, bilateral MM tomosynthesis screening BI performed at   [facility]. December 8, 2017, bilateral MM tomosynthesis screening BI performed at [facility]. No new mass lesion, suspicious calcifications, skin thickening, or area of architectural distortion   is noted. 11 mm focal   asymmetric density in the deep retroareolar right breast is visually stable from 2016. There are scattered fibroglandular densities (Composition Category B, BI-RADS type 2). IMPRESSION:   Category 1: Negative. No mammographic evidence of malignancy. Yearly mammograms are recommended (or as clinically indicated). RECOMMENDATION:   Routine screening mammogram in 1 year. Dictated By: Darylene Citrin DO    DD/DT: 02/05/20 1107               Signed By: Darylene Citrin DO 02/05/20 1104              : KEENAN            Vascular: pulses  dp  pt    Capillary Refill Time:   Hair growth  Skin:    Edema:    Neurologic:      Musculoskeletal/ Orthopedic examination minimal pain with palpation to the fifth metatarsal left foot full range of motion dorsiflexion plantarflexion inversion eversion without any difficulty or pain Web space   Derm:          Assessment and Plan: Today's treatment plan today working to keep the patient in the cam walker for 1 week then at that time then DC the cam walker and go into a tennis shoe  Anson Burnett was seen today for foot injury.     Diagnoses and all orders

## 2020-03-12 ENCOUNTER — OFFICE VISIT (OUTPATIENT)
Dept: PODIATRY | Age: 65
End: 2020-03-12
Payer: MEDICARE

## 2020-03-12 VITALS
SYSTOLIC BLOOD PRESSURE: 123 MMHG | TEMPERATURE: 98.2 F | DIASTOLIC BLOOD PRESSURE: 78 MMHG | HEIGHT: 61 IN | BODY MASS INDEX: 36.63 KG/M2 | WEIGHT: 194 LBS

## 2020-03-12 PROCEDURE — 1036F TOBACCO NON-USER: CPT | Performed by: PODIATRIST

## 2020-03-12 PROCEDURE — 1090F PRES/ABSN URINE INCON ASSESS: CPT | Performed by: PODIATRIST

## 2020-03-12 PROCEDURE — G9899 SCRN MAM PERF RSLTS DOC: HCPCS | Performed by: PODIATRIST

## 2020-03-12 PROCEDURE — G8417 CALC BMI ABV UP PARAM F/U: HCPCS | Performed by: PODIATRIST

## 2020-03-12 PROCEDURE — 3017F COLORECTAL CA SCREEN DOC REV: CPT | Performed by: PODIATRIST

## 2020-03-12 PROCEDURE — G8400 PT W/DXA NO RESULTS DOC: HCPCS | Performed by: PODIATRIST

## 2020-03-12 PROCEDURE — G8427 DOCREV CUR MEDS BY ELIG CLIN: HCPCS | Performed by: PODIATRIST

## 2020-03-12 PROCEDURE — 4040F PNEUMOC VAC/ADMIN/RCVD: CPT | Performed by: PODIATRIST

## 2020-03-12 PROCEDURE — G8482 FLU IMMUNIZE ORDER/ADMIN: HCPCS | Performed by: PODIATRIST

## 2020-03-12 PROCEDURE — 1123F ACP DISCUSS/DSCN MKR DOCD: CPT | Performed by: PODIATRIST

## 2020-03-12 PROCEDURE — 99213 OFFICE O/P EST LOW 20 MIN: CPT | Performed by: PODIATRIST

## 2020-06-05 ENCOUNTER — OFFICE VISIT (OUTPATIENT)
Dept: PODIATRY | Age: 65
End: 2020-06-05
Payer: MEDICARE

## 2020-06-05 VITALS
DIASTOLIC BLOOD PRESSURE: 84 MMHG | BODY MASS INDEX: 35.68 KG/M2 | TEMPERATURE: 98.2 F | WEIGHT: 189 LBS | SYSTOLIC BLOOD PRESSURE: 133 MMHG | HEIGHT: 61 IN

## 2020-06-05 PROCEDURE — 1036F TOBACCO NON-USER: CPT | Performed by: PODIATRIST

## 2020-06-05 PROCEDURE — 99213 OFFICE O/P EST LOW 20 MIN: CPT | Performed by: PODIATRIST

## 2020-06-05 PROCEDURE — G8417 CALC BMI ABV UP PARAM F/U: HCPCS | Performed by: PODIATRIST

## 2020-06-05 PROCEDURE — 4040F PNEUMOC VAC/ADMIN/RCVD: CPT | Performed by: PODIATRIST

## 2020-06-05 PROCEDURE — G9899 SCRN MAM PERF RSLTS DOC: HCPCS | Performed by: PODIATRIST

## 2020-06-05 PROCEDURE — 3017F COLORECTAL CA SCREEN DOC REV: CPT | Performed by: PODIATRIST

## 2020-06-05 PROCEDURE — G8400 PT W/DXA NO RESULTS DOC: HCPCS | Performed by: PODIATRIST

## 2020-06-05 PROCEDURE — 1090F PRES/ABSN URINE INCON ASSESS: CPT | Performed by: PODIATRIST

## 2020-06-05 PROCEDURE — 1123F ACP DISCUSS/DSCN MKR DOCD: CPT | Performed by: PODIATRIST

## 2020-06-05 PROCEDURE — G8427 DOCREV CUR MEDS BY ELIG CLIN: HCPCS | Performed by: PODIATRIST

## 2020-06-22 ENCOUNTER — OFFICE VISIT (OUTPATIENT)
Dept: FAMILY MEDICINE CLINIC | Age: 65
End: 2020-06-22
Payer: MEDICARE

## 2020-06-22 VITALS
DIASTOLIC BLOOD PRESSURE: 76 MMHG | RESPIRATION RATE: 18 BRPM | OXYGEN SATURATION: 97 % | SYSTOLIC BLOOD PRESSURE: 126 MMHG | HEART RATE: 84 BPM | WEIGHT: 193 LBS | HEIGHT: 61 IN | BODY MASS INDEX: 36.44 KG/M2 | TEMPERATURE: 97.8 F

## 2020-06-22 PROCEDURE — G8400 PT W/DXA NO RESULTS DOC: HCPCS | Performed by: PHYSICIAN ASSISTANT

## 2020-06-22 PROCEDURE — G9899 SCRN MAM PERF RSLTS DOC: HCPCS | Performed by: PHYSICIAN ASSISTANT

## 2020-06-22 PROCEDURE — 1036F TOBACCO NON-USER: CPT | Performed by: PHYSICIAN ASSISTANT

## 2020-06-22 PROCEDURE — 99214 OFFICE O/P EST MOD 30 MIN: CPT | Performed by: PHYSICIAN ASSISTANT

## 2020-06-22 PROCEDURE — 1090F PRES/ABSN URINE INCON ASSESS: CPT | Performed by: PHYSICIAN ASSISTANT

## 2020-06-22 PROCEDURE — 4040F PNEUMOC VAC/ADMIN/RCVD: CPT | Performed by: PHYSICIAN ASSISTANT

## 2020-06-22 PROCEDURE — G8427 DOCREV CUR MEDS BY ELIG CLIN: HCPCS | Performed by: PHYSICIAN ASSISTANT

## 2020-06-22 PROCEDURE — 1123F ACP DISCUSS/DSCN MKR DOCD: CPT | Performed by: PHYSICIAN ASSISTANT

## 2020-06-22 PROCEDURE — 3017F COLORECTAL CA SCREEN DOC REV: CPT | Performed by: PHYSICIAN ASSISTANT

## 2020-06-22 PROCEDURE — G8417 CALC BMI ABV UP PARAM F/U: HCPCS | Performed by: PHYSICIAN ASSISTANT

## 2020-06-22 NOTE — PROGRESS NOTES
20  Marjorie Sparks : 1955 Sex: female  Age 75 y.o. Subjective:  Chief Complaint   Patient presents with    Knee Pain     pt states right knee pain that started yesterday, feels like something is torn          HPI:   Marjorie Sparks , 75 y.o. female presents to express care for evaluation of right knee pain. The patient states that she was lowering herself into a chair yesterday and felt something tear in her right knee. The patient states that she is having quite a bit of pain and has a hard time ambulating. The patient is not having any hip pain or ankle pain. The patient denies any direct trauma to the knee. The patient has been receiving intra-articular injections from Dr. Richard Aldana for knee pain in the past.  She states that they did discuss potentially needing a knee replacement. The patient is not having any significant swelling to the right knee. The patient denies any other injuries or complaints at this time. ROS:   Unless otherwise stated in this report the patient's positive and negative responses for review of systems for constitutional, eyes, ENT, cardiovascular, respiratory, gastrointestinal, neurological, , musculoskeletal, and integument systems and related systems to the presenting problem are either stated in the history of present illness or were not pertinent or were negative for the symptoms and/or complaints related to the presenting medical problem. Positives and pertinent negatives as per HPI. All others reviewed and are negative.       PMH:     Past Medical History:   Diagnosis Date    Abdominal wound dehiscence     Anxiety     Arthritis     OSTEO    Depression     STABLE WITH MEDS PER PT    Fibromyalgia     History of colon polyps     Hyperlipidemia     Impaired fasting glucose     Lumbar spinal stenosis     Mild intermittent asthma     Obesity     Osteopenia     Pulmonary nodules     Sleep apnea     BI PAP      MACHINE PRE SET, PT DOES NOT KNOW General: Alert, no acute distress, patient resting comfortably   Skin: warm, intact, no pallor noted   Head: Normocephalic, atraumatic   Eye: Normal conjunctiva   Respiratory: No acute distress   Musculoskeletal: No obvious deformity noted to the right lower extremity. The patient has minimal swelling but most of the tenderness is to the anterior aspect of the right knee. The patient was able to fully extend the right leg. The patient was able to hold it off of the cart. The patient additionally had decreased range of motion due to pain. The patient had no calf tenderness. Pulses intact. No cyanosis or mottling appreciated. Neurological: alert and orient x4, normal sensory and motor observed. Psychiatric: Cooperative        Testing:           Medical Decision Making:     The patient had x-rays obtained in the office today and formal radiology report is pending at this time. I personally reviewed the x-ray images and did not see any evidence of acute fracture, there was evidence of significant degenerative changes and medial joint space narrowing. We did discuss the potential of occult fracture with the patient and the need for followup. The patient understands the need for follow-up and repeat evaluation. The patient was educated on RICE therapy, nsaids, and tylenol. The patient has crutches at home and she will yet start using these. The patient will ice, elevate     The patient is to return if any of the signs or symptoms worsen. The patient is to follow-up with PCP in the next 2-3 days for repeat evaluation repeat assessment or go directly to the emergency department. Clinical Impression:   [employer] was seen today for knee pain. Diagnoses and all orders for this visit:    Acute pain of right knee  -     XR KNEE RIGHT (MIN 4 VIEWS); Future  -     External Referral To Orthopedic Surgery    Other orders  -     oxaprozin (DAYPRO) 600 MG tablet;  Take 1 tablet by mouth 2 times daily for 7 days

## 2020-08-03 ENCOUNTER — OFFICE VISIT (OUTPATIENT)
Dept: FAMILY MEDICINE CLINIC | Age: 65
End: 2020-08-03
Payer: MEDICARE

## 2020-08-03 VITALS
HEIGHT: 61 IN | SYSTOLIC BLOOD PRESSURE: 132 MMHG | TEMPERATURE: 97.7 F | BODY MASS INDEX: 36.4 KG/M2 | HEART RATE: 86 BPM | WEIGHT: 192.8 LBS | OXYGEN SATURATION: 97 % | DIASTOLIC BLOOD PRESSURE: 80 MMHG

## 2020-08-03 PROBLEM — S92.355A CLOSED NONDISPLACED FRACTURE OF FIFTH LEFT METATARSAL BONE: Status: RESOLVED | Noted: 2020-02-10 | Resolved: 2020-08-03

## 2020-08-03 PROBLEM — Z23 ENCOUNTER FOR IMMUNIZATION: Status: RESOLVED | Noted: 2019-10-07 | Resolved: 2020-08-03

## 2020-08-03 PROCEDURE — 99214 OFFICE O/P EST MOD 30 MIN: CPT | Performed by: FAMILY MEDICINE

## 2020-08-03 RX ORDER — CYCLOBENZAPRINE HCL 10 MG
TABLET ORAL
Qty: 90 TABLET | Refills: 2 | Status: SHIPPED
Start: 2020-08-03 | End: 2020-08-12 | Stop reason: SDUPTHER

## 2020-08-03 RX ORDER — OMEPRAZOLE 20 MG/1
20 CAPSULE, DELAYED RELEASE ORAL DAILY
Qty: 90 CAPSULE | Refills: 1 | Status: SHIPPED | OUTPATIENT
Start: 2020-08-03

## 2020-08-03 ASSESSMENT — ENCOUNTER SYMPTOMS
WHEEZING: 0
SHORTNESS OF BREATH: 0
NAUSEA: 0
VOMITING: 0

## 2020-08-03 NOTE — PROGRESS NOTES
RESTLESS LEGS AND Sarika Kendell         Past Medical History:   Diagnosis Date    Abdominal wound dehiscence     Anxiety     Arthritis     OSTEO    Depression     STABLE WITH MEDS PER PT    Fibromyalgia     History of colon polyps     Hyperlipidemia     Impaired fasting glucose     Lumbar spinal stenosis     Mild intermittent asthma     Obesity     Osteopenia     Pulmonary nodules     Vitamin B12 deficiency     Vitamin D deficiency      Past Surgical History:   Procedure Laterality Date    ABDOMEN SURGERY  04/2013    panniculectomy with abdominoplasty    ANKLE SURGERY Bilateral     X2    BREAST REDUCTION SURGERY Bilateral     BREAST SURGERY Left 06/2013    needle lumpectomy, fibrocystic changes    CHOLECYSTECTOMY, LAPAROSCOPIC      HERNIA REPAIR      x2, Sankovic  x1, Generalovich    HYSTERECTOMY      LIPECTOMY      RECTAL PROLAPSE REPAIR      TONSILLECTOMY       Social History     Socioeconomic History    Marital status:      Spouse name: Not on file    Number of children: Not on file    Years of education: Not on file    Highest education level: Not on file   Occupational History    Occupation: retired [employer] strain: Not on file    Food insecurity     Worry: Not on file     Inability: Not on file   [employer] needs     Medical: Not on file     Non-medical: Not on file   Tobacco Use    Smoking status: Never Smoker    Smokeless tobacco: Never Used   Substance and Sexual Activity    Alcohol use: No    Drug use: No    Sexual activity: Not on file   Lifestyle    Physical activity     Days per week: Not on file     Minutes per session: Not on file    Stress: Not on file   Relationships    Social connections     Talks on phone: Not on file     Gets together: Not on file     Attends [employer] service: Not on file     Active member of club or organization: Not on file     Attends meetings of clubs or organizations: Not on file     Relationship status: Not on file    Intimate partner violence     Fear of current or ex partner: Not on file     Emotionally abused: Not on file     Physically abused: Not on file     Forced sexual activity: Not on file   Other Topics Concern    Not on file   Social History Narrative    Not on file     Family History   Problem Relation Age of Onset    Breast Cancer Mother     Diabetes type 2  Father     Other Other         no siblings       Objective:        Vitals:    08/03/20 1057   BP: 132/80   Pulse: 86   Temp: 97.7 °F (36.5 °C)   TempSrc: Temporal   SpO2: 97%   Weight: 192 lb 12.8 oz (87.5 kg)   Height: 5' 1\" (1.549 m)         Physical Exam  Constitutional:       Appearance: Normal appearance. HENT:      Head: Normocephalic and atraumatic. Nose: Nose normal.      Mouth/Throat:      Mouth: Mucous membranes are moist.      Pharynx: No posterior oropharyngeal erythema. Eyes:      Extraocular Movements: Extraocular movements intact. Conjunctiva/sclera: Conjunctivae normal.   Cardiovascular:      Rate and Rhythm: Normal rate. Heart sounds: Normal heart sounds. Pulmonary:      Effort: Pulmonary effort is normal.      Breath sounds: Normal breath sounds. Skin:     General: Skin is warm. Neurological:      Mental Status: She is alert and oriented to person, place, and time. Psychiatric:         Mood and Affect: Mood normal.         Behavior: Behavior normal.         Plan Per Assessment:  Ramon was seen today for pre-op exam.    Diagnoses and all orders for this visit:    Preop exam for internal medicine    Gastroesophageal reflux disease, esophagitis presence not specified  -     omeprazole (PRILOSEC) 20 MG delayed release capsule; Take 1 capsule by mouth Daily    Fibromyalgia  -     cyclobenzaprine (FLEXERIL) 10 MG tablet;  Take 1/2 to 1 tablet every night at bedtime      Medically optimized for surgery  RCRI 0  Hold all NSAIDs/herbals/vitamins 7 days prior to surgery                        Seen By:      Sami Alarcon MD

## 2020-08-12 NOTE — TELEPHONE ENCOUNTER
Julissa Gupta calling in you reordered flexeril 10mg 1/2 to 1 tab daily. Dr Pam Manzanares increased this 6mos ago to 1.5 tab daily. Please send new rx.  rx pended

## 2020-08-13 RX ORDER — CYCLOBENZAPRINE HCL 10 MG
TABLET ORAL
Qty: 135 TABLET | Refills: 2 | Status: SHIPPED
Start: 2020-08-13 | End: 2022-08-01

## 2020-09-03 ENCOUNTER — OFFICE VISIT (OUTPATIENT)
Dept: PRIMARY CARE CLINIC | Age: 65
End: 2020-09-03
Payer: MEDICARE

## 2020-09-03 ENCOUNTER — HOSPITAL ENCOUNTER (OUTPATIENT)
Age: 65
Discharge: HOME | End: 2020-09-05
Payer: MEDICARE

## 2020-09-03 VITALS
HEIGHT: 61 IN | OXYGEN SATURATION: 95 % | TEMPERATURE: 98.1 F | SYSTOLIC BLOOD PRESSURE: 136 MMHG | BODY MASS INDEX: 35.68 KG/M2 | RESPIRATION RATE: 16 BRPM | HEART RATE: 90 BPM | DIASTOLIC BLOOD PRESSURE: 90 MMHG | WEIGHT: 189 LBS

## 2020-09-03 PROCEDURE — 4040F PNEUMOC VAC/ADMIN/RCVD: CPT | Performed by: PHYSICIAN ASSISTANT

## 2020-09-03 PROCEDURE — 1123F ACP DISCUSS/DSCN MKR DOCD: CPT | Performed by: PHYSICIAN ASSISTANT

## 2020-09-03 PROCEDURE — 1036F TOBACCO NON-USER: CPT | Performed by: PHYSICIAN ASSISTANT

## 2020-09-03 PROCEDURE — G8427 DOCREV CUR MEDS BY ELIG CLIN: HCPCS | Performed by: PHYSICIAN ASSISTANT

## 2020-09-03 PROCEDURE — U0003 INFECTIOUS AGENT DETECTION BY NUCLEIC ACID (DNA OR RNA); SEVERE ACUTE RESPIRATORY SYNDROME CORONAVIRUS 2 (SARS-COV-2) (CORONAVIRUS DISEASE [COVID-19]), AMPLIFIED PROBE TECHNIQUE, MAKING USE OF HIGH THROUGHPUT TECHNOLOGIES AS DESCRIBED BY CMS-2020-01-R: HCPCS

## 2020-09-03 PROCEDURE — 3017F COLORECTAL CA SCREEN DOC REV: CPT | Performed by: PHYSICIAN ASSISTANT

## 2020-09-03 PROCEDURE — 99213 OFFICE O/P EST LOW 20 MIN: CPT | Performed by: PHYSICIAN ASSISTANT

## 2020-09-03 PROCEDURE — G9899 SCRN MAM PERF RSLTS DOC: HCPCS | Performed by: PHYSICIAN ASSISTANT

## 2020-09-03 PROCEDURE — G8400 PT W/DXA NO RESULTS DOC: HCPCS | Performed by: PHYSICIAN ASSISTANT

## 2020-09-03 PROCEDURE — G8417 CALC BMI ABV UP PARAM F/U: HCPCS | Performed by: PHYSICIAN ASSISTANT

## 2020-09-03 PROCEDURE — 1090F PRES/ABSN URINE INCON ASSESS: CPT | Performed by: PHYSICIAN ASSISTANT

## 2020-09-03 RX ORDER — MELOXICAM 7.5 MG/1
1 TABLET ORAL DAILY
COMMUNITY
Start: 2020-08-16 | End: 2021-03-11

## 2020-09-03 RX ORDER — SENNOSIDES 8.6 MG
1 TABLET ORAL 2 TIMES DAILY
COMMUNITY
Start: 2020-08-16 | End: 2021-03-11

## 2020-09-03 RX ORDER — DOCUSATE SODIUM 100 MG/1
100 CAPSULE, LIQUID FILLED ORAL 2 TIMES DAILY
COMMUNITY
Start: 2020-08-16 | End: 2021-03-11

## 2020-09-03 NOTE — PROGRESS NOTES
9/3/20  Sayra Hussein : 1955 Sex: female  Age 77 y.o. Subjective:  Chief Complaint   Patient presents with    Cough     x 1 week, family members tested positive for COVID    Diarrhea     x1 week         HPI:   Sayra Hussein , 77 y.o. female presents to [facility] care for evaluation of possible COVID-19. The patient states that she recently had right total knee replacement 2 weeks ago. Moved in with her daughter and daughter tested positive for COVID-19. The patient has had diarrhea for about a week now. The patient is also had a slight cough. No significant sputum production. Does have a minimal headache that seems to wax and wane. The patient is also noted some nausea. No vomiting. The patient otherwise feels well. The patient has not detected any fevers. The incision site to the right knee does appear to be healing well. ROS:   Unless otherwise stated in this report the patient's positive and negative responses for review of systems for constitutional, eyes, ENT, cardiovascular, respiratory, gastrointestinal, neurological, , musculoskeletal, and integument systems and related systems to the presenting problem are either stated in the history of present illness or were not pertinent or were negative for the symptoms and/or complaints related to the presenting medical problem. Positives and pertinent negatives as per HPI. All others reviewed and are negative.       PMH:     Past Medical History:   Diagnosis Date    Abdominal wound dehiscence     Anxiety     Arthritis     OSTEO    Depression     STABLE WITH MEDS PER PT    Fibromyalgia     History of colon polyps     Hyperlipidemia     Impaired fasting glucose     Lumbar spinal stenosis     Mild intermittent asthma     Obesity     Osteopenia     Pulmonary nodules     Vitamin B12 deficiency     Vitamin D deficiency        Past Surgical History:   Procedure Laterality Date    ABDOMEN SURGERY  2013    panniculectomy with abdominoplasty    ANKLE SURGERY Bilateral     X2    BREAST REDUCTION SURGERY Bilateral     BREAST SURGERY Left 06/2013    needle lumpectomy, fibrocystic changes    CHOLECYSTECTOMY, LAPAROSCOPIC      HERNIA REPAIR      x2, Sankovic  x1, Generalovich    HYSTERECTOMY      LIPECTOMY      RECTAL PROLAPSE REPAIR      TONSILLECTOMY         Family History   Problem Relation Age of Onset    Breast Cancer Mother     Diabetes type 2  Father     Other Other         no siblings       Medications:     Current Outpatient Medications:     senna (SENOKOT) 8.6 MG TABS tablet, Take 1 tablet by mouth 2 times daily, Disp: , Rfl:     meloxicam (MOBIC) 7.5 MG tablet, Take 1 tablet by mouth daily, Disp: , Rfl:      MG capsule, Take 100 mg by mouth 2 times daily, Disp: , Rfl:     cyclobenzaprine (FLEXERIL) 10 MG tablet, Tale 1.5 tabs nightly, Disp: 135 tablet, Rfl: 2    omeprazole (PRILOSEC) 20 MG delayed release capsule, Take 1 capsule by mouth Daily, Disp: 90 capsule, Rfl: 1    aspirin (SB LOW DOSE ASA EC) 81 MG EC tablet, Take by mouth, Disp: , Rfl:     acetaminophen (TYLENOL) 325 MG tablet, Take 650 mg by mouth every 6 hours as needed for Pain, Disp: , Rfl:     vitamin B-12 (CYANOCOBALAMIN) 1000 MCG tablet, Take 1,000 mcg by mouth daily , Disp: , Rfl:     Cholecalciferol (VITAMIN D3) 1000 units CAPS, Take 2,000 Units by mouth daily Last dose 6/15/13, Disp: , Rfl:     Allergies: Allergies   Allergen Reactions    Promethazine-Phenylephrine      RESTLESS LEGS AND EMISIS        PHENARGAN       Social History:     Social History     Tobacco Use    Smoking status: Never Smoker    Smokeless tobacco: Never Used   Substance Use Topics    Alcohol use: No    Drug use: No       Patient lives at home.     Physical Exam:     Vitals:    09/03/20 1357   BP: (!) 136/90   Pulse: 90   Resp: 16   Temp: 98.1 °F (36.7 °C)   SpO2: 95%   Weight: 189 lb (85.7 kg)   Height: 5' 1\" (1.549 m)       Exam:  Physical Exam  Nurses note and vital signs reviewed and patient is not hypoxic. General: The patient appears well and in no apparent distress. Patient is resting comfortably on cart. Skin: Warm, dry, no pallor noted. There is no rash noted. Head: Normocephalic, atraumatic. Eye: Normal conjunctiva  Ears, Nose, Mouth, and Throat: Wearing mask  Cardiovascular: Regular Rate and Rhythm  Respiratory: Patient is in no distress, no accessory muscle use, lungs are clear to auscultation, no wheezing, crackles or rhonchi  Back: Non-tender, no CVA tenderness bilaterally to percussion. GI: Normal bowel sounds, no tenderness to palpation, no masses appreciated. No rebound, guarding, or rigidity noted. Musculoskeletal: The patient has no evidence of calf tenderness, no pitting edema, symmetrical pulses noted bilaterally. Vertical midline anterior incision to the right knee does appear to be healing well, there is some scabbing noted. There is no evidence of erythema, warmth, drainage or discharge  Neurological: A&O x4, normal speech            Testing:     COVID-19 swab pending      Medical Decision Making:     Vital signs reviewed    Past medical history reviewed. Allergies reviewed. Medications reviewed. The patient on arrival does not appear to be in any apparent distress or discomfort. Vital signs reviewed and noted to be within normal limits. The patient will have COVID-19 swab performed. The patient will continue to monitor symptoms closely. Call with any questions or concerns. Patient is to isolate and quarantine until we can obtain the results of the COVID-19. We will hold off on any further medications at this point      Clinical Impression:   Hermilo Coronado was seen today for cough and diarrhea. Diagnoses and all orders for this visit:    Exposure to COVID-19 virus  -     COVID-19 Ambulatory;  Future    Diarrhea, unspecified type    Cough        The patient is to call for any concerns or return if any of the signs or symptoms worsen. The patient is to follow-up with PCP in the next 2-3 days for repeat evaluation repeat assessment or go directly to the emergency department.      SIGNATURE: Marcelle Sahu III, PA-C

## 2020-09-09 ENCOUNTER — TELEPHONE (OUTPATIENT)
Dept: PRIMARY CARE CLINIC | Age: 65
End: 2020-09-09

## 2020-09-09 LAB
SARS-COV-2: NOT DETECTED
SOURCE: NORMAL

## 2021-02-23 ENCOUNTER — HOSPITAL ENCOUNTER (OUTPATIENT)
Dept: GENERAL RADIOLOGY | Age: 66
Discharge: HOME | End: 2021-02-25
Payer: MEDICARE

## 2021-02-23 DIAGNOSIS — R13.10 DYSPHAGIA, UNSPECIFIED TYPE: ICD-10-CM

## 2021-02-23 PROCEDURE — 74220 X-RAY XM ESOPHAGUS 1CNTRST: CPT

## 2021-02-23 PROCEDURE — 2500000003 HC RX 250 WO HCPCS: Performed by: RADIOLOGY

## 2021-02-23 PROCEDURE — A4641 RADIOPHARM DX AGENT NOC: HCPCS | Performed by: RADIOLOGY

## 2021-02-23 PROCEDURE — 6370000000 HC RX 637 (ALT 250 FOR IP): Performed by: RADIOLOGY

## 2021-02-23 PROCEDURE — 6360000004 HC RX CONTRAST MEDICATION: Performed by: RADIOLOGY

## 2021-02-23 RX ADMIN — ANTACID/ANTIFLATULENT 1 EACH: 380; 550; 10; 10 GRANULE, EFFERVESCENT ORAL at 11:33

## 2021-02-23 RX ADMIN — BARIUM SULFATE 1 TABLET: 700 TABLET ORAL at 11:32

## 2021-02-23 RX ADMIN — BARIUM SULFATE 176 G: 960 POWDER, FOR SUSPENSION ORAL at 11:32

## 2021-02-23 RX ADMIN — BARIUM SULFATE 140 ML: 980 POWDER, FOR SUSPENSION ORAL at 11:33

## 2021-03-10 ENCOUNTER — HOSPITAL ENCOUNTER (OUTPATIENT)
Age: 66
Discharge: HOME | End: 2021-03-12
Payer: MEDICARE

## 2021-03-10 DIAGNOSIS — Z01.818 PREOP TESTING: ICD-10-CM

## 2021-03-10 PROCEDURE — U0003 INFECTIOUS AGENT DETECTION BY NUCLEIC ACID (DNA OR RNA); SEVERE ACUTE RESPIRATORY SYNDROME CORONAVIRUS 2 (SARS-COV-2) (CORONAVIRUS DISEASE [COVID-19]), AMPLIFIED PROBE TECHNIQUE, MAKING USE OF HIGH THROUGHPUT TECHNOLOGIES AS DESCRIBED BY CMS-2020-01-R: HCPCS

## 2021-03-11 LAB — SARS-COV-2, PCR: NOT DETECTED

## 2021-03-11 RX ORDER — DULOXETIN HYDROCHLORIDE 60 MG/1
60 CAPSULE, DELAYED RELEASE ORAL DAILY
COMMUNITY
End: 2022-08-01

## 2021-03-11 RX ORDER — NAPROXEN 500 MG/1
500 TABLET ORAL DAILY
COMMUNITY
End: 2022-08-01

## 2021-03-11 RX ORDER — POLYETHYLENE GLYCOL 3350 17 G/17G
17 POWDER, FOR SOLUTION ORAL DAILY PRN
COMMUNITY
End: 2022-04-11 | Stop reason: CLARIF

## 2021-03-11 RX ORDER — PHENOL 1.4 %
AEROSOL, SPRAY (ML) MUCOUS MEMBRANE NIGHTLY
COMMUNITY
End: 2022-04-11 | Stop reason: CLARIF

## 2021-03-11 NOTE — PROGRESS NOTES
[facility] PRE-ADMISSION TESTING INSTRUCTIONS    The Preadmission Testing patient is instructed accordingly using the following criteria (check applicable):    ARRIVAL INSTRUCTIONS:  [x] Parking the day of Surgery is located in the Main Entrance lot. Upon entering the door, make an immediate right to the surgery reception desk    [x] Bring photo ID and insurance card    [] Bring in a copy of Living will or Durable Power of  papers. [x] Please be sure to arrange for responsible adult to provide transportation to and from the hospital    [x] Please arrange for responsible adult to be with you for the 24 hour period post procedure due to having anesthesia      GENERAL INSTRUCTIONS:    [x] Nothing by mouth after midnight, including gum, candy, mints or water    [x] You may brush your teeth, but do not swallow any water    [x] Take medications as instructed with 1-2 oz of water    [x] Stop herbal supplements and vitamins 5 days prior to procedure    [] Follow preop dosing of blood thinners per physician instructions    [] Take 1/2 dose of evening insulin, but no insulin after midnight    [] No oral diabetic medications after midnight    [] If diabetic and have low blood sugar or feel symptomatic, take 1-2oz apple juice only    [] Bring inhalers day of surgery    [] Bring C-PAP/ Bi-Pap day of surgery    [] Bring urine specimen day of surgery    [x] Shower or bath with soap, lather and rinse well, AM of Surgery, no lotion, powders or creams to surgical site    [] Follow bowel prep as instructed per surgeon    [x] No tobacco products within 24 hours of surgery     [x] No alcohol or illegal drug use within 24 hours of surgery.     [x] Jewelry, body piercing's, eyeglasses, contact lenses and dentures are not permitted into surgery (bring cases)      [x] Please do not wear any nail polish, make up or hair products on the day of surgery    [x] You can expect a call the business day prior to procedure to notify you if your arrival time changes    [x] If you receive a survey after surgery we would greatly appreciate your comments    [] Parent/guardian of a minor must accompany their child and remain on the premises  the entire time they are under our care     [] Pediatric patients may bring favorite toy, blanket or comfort item with them    [] A caregiver or family member must remain with the patient during their stay if they are mentally handicapped, have dementia, disoriented or unable to use a call light or would be a safety concern if left unattended    [x] Please notify surgeon if you develop any illness between now and time of surgery (cold, cough, sore throat, fever, nausea, vomiting) or any signs of infections  including skin, wounds, and dental.    []  The Outpatient Pharmacy is available to fill your prescription here on your day of surgery, ask your preop nurse for details    [x] Other instructions:  Wear comfortable clothing    EDUCATIONAL MATERIALS PROVIDED:    [] PAT Preoperative Education Packet/Booklet     [] Medication List    [] Transfusion bracelet applied with instructions    [] Shower with soap, lather and rinse well, and use CHG wipes provided the evening before surgery as instructed    [] Incentive spirometer with instructions        Have you been tested for COVID  Yes           Have you been told you were positive for COVID No  Have you had any known exposure to someone that is positive for COVID No  Do you have a cough                   No              Do you have shortness of breath No                 Do you have a sore throat            No                Are you having chills                    No                Are you having muscle aches. No                    Please come to the hospital wearing a mask and have your significant other wear a mask as well.   Both of you should check your temperature before leaving to come here,  if it is 100 or higher please call [phone] for instruction.

## 2021-03-12 ENCOUNTER — PREP FOR PROCEDURE (OUTPATIENT)
Dept: GASTROENTEROLOGY | Age: 66
End: 2021-03-12

## 2021-03-12 RX ORDER — SODIUM CHLORIDE 0.9 % (FLUSH) 0.9 %
10 SYRINGE (ML) INJECTION EVERY 12 HOURS SCHEDULED
Status: CANCELLED | OUTPATIENT
Start: 2021-03-12

## 2021-03-12 RX ORDER — SODIUM CHLORIDE 0.9 % (FLUSH) 0.9 %
10 SYRINGE (ML) INJECTION PRN
Status: CANCELLED | OUTPATIENT
Start: 2021-03-12

## 2021-03-12 RX ORDER — SODIUM CHLORIDE 9 MG/ML
INJECTION, SOLUTION INTRAVENOUS CONTINUOUS
Status: CANCELLED | OUTPATIENT
Start: 2021-03-12

## 2021-03-12 NOTE — H&P (VIEW-ONLY)
Paxton Martinez        : 1955    Referring Provider:   Dallin Walsh M.D. Problem:  Dysphagia. Subjective  She is currently 77years of age. She has a complaint of dysphagia. Meat and bread. Stops midsternal.  May pass or return. Ongoing 5 years. She says she underwent endoscopic evaluation with dilatation through the gastroenterology group in [facility]. She says the stricture would not stretch. I do not know what that means. She has had no GI series. She has a history of gastroesophageal reflux symptomatology. Currently not, but on omeprazole. She has not lost weight. Symptoms seem persistent but not progressive. PMH/Surgical Hx:   Colonoscopy on 2 separate occaisons in [place]. Apparently, Dr. Siobhan Murphy  and again in 2017. She has a history of gastroesophageal reflux apparently with complications of a stricture. She has had 2 previous hernia repairs. FH:  Father  at 80, mother at 61. Malignancy. Apparently, breast.  SH:  She is . 3 children. 3 siblings. No tobacco or alcohol. Current Medications:  Omeprazole, cyclobenzaprine, aspirin, duloxetine, B12, vitamin D, melatonin. Allergies:  PHENERGAN. Objective  Weight 200 pounds. Height 61 inches. Blood pressure 160/96. Examination reveals no pallor or scleral icterus. No neck vein elevation or adenopathy. Lungs clear. Heart tones normal.  Regular rate and regular rhythm. No audible murmur or gallop. Soft abdomen. Obese. Nontender. No hepatic or splenic enlargement, ascites or dependent edema. A digital rectal exam was deferred. Assessment  She certainly seems to have a mechanical problem, and in all likelihood, it is a complication of reflux. Details and the history suggest attempts at endoscopy and dilatation either failed or were unsuccessful for any long-term improvement. Plan  EGD.

## 2021-03-15 ENCOUNTER — HOSPITAL ENCOUNTER (OUTPATIENT)
Age: 66
Setting detail: OUTPATIENT SURGERY
Discharge: HOME | End: 2021-03-15
Attending: INTERNAL MEDICINE | Admitting: INTERNAL MEDICINE
Payer: MEDICARE

## 2021-03-15 ENCOUNTER — ANESTHESIA EVENT (OUTPATIENT)
Dept: ENDOSCOPY | Age: 66
End: 2021-03-15
Payer: MEDICARE

## 2021-03-15 ENCOUNTER — ANESTHESIA (OUTPATIENT)
Dept: ENDOSCOPY | Age: 66
End: 2021-03-15
Payer: MEDICARE

## 2021-03-15 VITALS
HEART RATE: 70 BPM | WEIGHT: 192 LBS | RESPIRATION RATE: 16 BRPM | BODY MASS INDEX: 36.25 KG/M2 | DIASTOLIC BLOOD PRESSURE: 76 MMHG | HEIGHT: 61 IN | SYSTOLIC BLOOD PRESSURE: 137 MMHG | OXYGEN SATURATION: 97 % | TEMPERATURE: 96.9 F

## 2021-03-15 VITALS
DIASTOLIC BLOOD PRESSURE: 74 MMHG | SYSTOLIC BLOOD PRESSURE: 136 MMHG | RESPIRATION RATE: 19 BRPM | OXYGEN SATURATION: 98 %

## 2021-03-15 DIAGNOSIS — Z01.818 PREOP TESTING: Primary | ICD-10-CM

## 2021-03-15 PROCEDURE — 3609017100 HC EGD: Performed by: INTERNAL MEDICINE

## 2021-03-15 PROCEDURE — 2580000003 HC RX 258: Performed by: INTERNAL MEDICINE

## 2021-03-15 PROCEDURE — 6360000002 HC RX W HCPCS: Performed by: NURSE ANESTHETIST, CERTIFIED REGISTERED

## 2021-03-15 PROCEDURE — 7100000011 HC PHASE II RECOVERY - ADDTL 15 MIN: Performed by: INTERNAL MEDICINE

## 2021-03-15 PROCEDURE — 7100000010 HC PHASE II RECOVERY - FIRST 15 MIN: Performed by: INTERNAL MEDICINE

## 2021-03-15 PROCEDURE — 2709999900 HC NON-CHARGEABLE SUPPLY: Performed by: INTERNAL MEDICINE

## 2021-03-15 PROCEDURE — 3700000001 HC ADD 15 MINUTES (ANESTHESIA): Performed by: INTERNAL MEDICINE

## 2021-03-15 PROCEDURE — 3700000000 HC ANESTHESIA ATTENDED CARE: Performed by: INTERNAL MEDICINE

## 2021-03-15 RX ORDER — SODIUM CHLORIDE 0.9 % (FLUSH) 0.9 %
10 SYRINGE (ML) INJECTION PRN
Status: DISCONTINUED | OUTPATIENT
Start: 2021-03-15 | End: 2021-03-15 | Stop reason: HOSPADM

## 2021-03-15 RX ORDER — SODIUM CHLORIDE 9 MG/ML
INJECTION, SOLUTION INTRAVENOUS CONTINUOUS
Status: DISCONTINUED | OUTPATIENT
Start: 2021-03-15 | End: 2021-03-15 | Stop reason: HOSPADM

## 2021-03-15 RX ORDER — SODIUM CHLORIDE 0.9 % (FLUSH) 0.9 %
10 SYRINGE (ML) INJECTION EVERY 12 HOURS SCHEDULED
Status: DISCONTINUED | OUTPATIENT
Start: 2021-03-15 | End: 2021-03-15 | Stop reason: HOSPADM

## 2021-03-15 RX ORDER — MIDAZOLAM HYDROCHLORIDE 1 MG/ML
INJECTION INTRAMUSCULAR; INTRAVENOUS PRN
Status: DISCONTINUED | OUTPATIENT
Start: 2021-03-15 | End: 2021-03-15 | Stop reason: SDUPTHER

## 2021-03-15 RX ORDER — PROPOFOL 10 MG/ML
INJECTION, EMULSION INTRAVENOUS PRN
Status: DISCONTINUED | OUTPATIENT
Start: 2021-03-15 | End: 2021-03-15 | Stop reason: SDUPTHER

## 2021-03-15 RX ADMIN — PROPOFOL 160 MG: 10 INJECTION, EMULSION INTRAVENOUS at 09:35

## 2021-03-15 RX ADMIN — SODIUM CHLORIDE: 9 INJECTION, SOLUTION INTRAVENOUS at 09:32

## 2021-03-15 RX ADMIN — MIDAZOLAM 2 MG: 1 INJECTION INTRAMUSCULAR; INTRAVENOUS at 09:32

## 2021-03-15 NOTE — BRIEF OP NOTE
Brief Postoperative Note      Patient: Armen Kidd  YOB: 1955  MRN: [id number]    Date of Procedure: 3/15/2021    Pre-Op Diagnosis: DYSPHAGIA    Post-Op Diagnosis: hiatal hernia w/o mechanical obstruction       Procedure(s):  EGD ESOPHAGOGASTRODUODENOSCOPY POSSIBLE DILATION    Surgeon(s):  Brooke Daily MD    Assistant:  * No surgical staff found *    Anesthesia: Monitor Anesthesia Care    Estimated Blood Loss (mL): 0    Complications: None    Specimens:   * No specimens in log *    Implants:  * No implants in log *      Drains:   Closed/Suction Drain Midline;Distal Abdomen Bulb (Active)       Findings: mucus in esophagus, no stricture, esophogram c/w motility issue    Electronically signed by Brooke Daily MD on 3/15/2021 at 9:45 AM

## 2021-03-15 NOTE — ANESTHESIA PRE PROCEDURE
Department of Anesthesiology  Preprocedure Note       Name:  Crissy Mckay   Age:  77 y.o.  :  1955                                          MRN:  [id number]         Date:  3/15/2021      Surgeon: Kenyetta Aguila):  Lianne Reyes MD    Procedure: Procedure(s):  EGD ESOPHAGOGASTRODUODENOSCOPY POSSIBLE DILATION    Medications prior to admission:   Prior to Admission medications    Medication Sig Start Date End Date Taking? Authorizing Provider   polyethylene glycol (GLYCOLAX) 17 g packet Take 17 g by mouth daily as needed for Constipation   Yes Historical Provider, MD   DULoxetine (CYMBALTA) 60 MG extended release capsule Take 60 mg by mouth daily Instructed to take morning of surgery with a sip of water   Yes Historical Provider, MD   Melatonin 10 MG TABS Take by mouth nightly Instructed to hold 5 days pre-op   Yes Historical Provider, MD   naproxen (NAPROSYN) 500 MG tablet Take 500 mg by mouth daily To verify last dose w    Yes Historical Provider, MD   cyclobenzaprine (FLEXERIL) 10 MG tablet Tale 1.5 tabs nightly 20  Yes Samantha Gray MD   omeprazole (PRILOSEC) 20 MG delayed release capsule Take 1 capsule by mouth Daily 8/3/20  Yes Samantha Gray MD   aspirin (SB LOW DOSE ASA EC) 81 MG EC tablet Take by mouth To verify last dose w  18  Yes Historical Provider, MD   acetaminophen (TYLENOL) 325 MG tablet Take 650 mg by mouth every 6 hours as needed for Pain   Yes Historical Provider, MD   Cholecalciferol (VITAMIN D3) 1000 units CAPS Take 2,000 Units by mouth daily    Yes Historical Provider, MD   vitamin B-12 (CYANOCOBALAMIN) 1000 MCG tablet Take 1,000 mcg by mouth daily     Historical Provider, MD       Current medications:    No current facility-administered medications for this encounter. Allergies:     Allergies   Allergen Reactions    Promethazine-Phenylephrine      RESTLESS LEGS AND EMISIS        PHENARGAN       Problem List:    Patient Active Problem List   Diagnosis Code    Depression F32.9    Arthritis M19.90    Hyperlipidemia E78.5    Anxiety F41.9    Fibromyalgia M79.7    Lumbar spinal stenosis M48.061    Mild intermittent asthma J45.20    Pulmonary nodules R91.8    Osteopenia M85.80    Obesity E66.9       Past Medical History:        Diagnosis Date    Abdominal wound dehiscence 2013    Anxiety     Arthritis     OSTEO    Depression     STABLE WITH MEDS PER PT    Difficulty swallowing     Fibromyalgia     History of colon polyps     Hyperlipidemia     diet controlled    Lumbar spinal stenosis     Mild intermittent asthma     Obesity     Osteopenia     Pulmonary nodules     Vitamin B12 deficiency     Vitamin D deficiency        Past Surgical History:        Procedure Laterality Date    ABDOMEN SURGERY  04/2013    panniculectomy with abdominoplasty    ANKLE SURGERY Bilateral     X2    BREAST REDUCTION SURGERY Bilateral     BREAST SURGERY Left 06/2013    needle lumpectomy, fibrocystic changes    CHOLECYSTECTOMY, LAPAROSCOPIC      HERNIA REPAIR      x2, Sankovic  x1, Generalovich    HYSTERECTOMY      LIPECTOMY      RECTAL PROLAPSE REPAIR      TONSILLECTOMY         Social History:    Social History     Tobacco Use    Smoking status: Never Smoker    Smokeless tobacco: Never Used   Substance Use Topics    Alcohol use: No                                Counseling given: Not Answered      Vital Signs (Current):   Vitals:    03/11/21 0948   Weight: 192 lb (87.1 kg)   Height: 5' 1\" (1.549 m)                                              BP Readings from Last 3 Encounters:   09/03/20 (!) 136/90   08/03/20 132/80   06/22/20 126/76       NPO Status:                                                                                 BMI:   Wt Readings from Last 3 Encounters:   03/11/21 192 lb (87.1 kg)   09/03/20 189 lb (85.7 kg)   08/03/20 192 lb 12.8 oz (87.5 kg)     Body mass index is 36.28 kg/m².     CBC:   Lab Results   Component Value Date    WBC 8.7 06/08/2020 RBC 5.29 06/08/2020    HGB 15.4 06/08/2020    HCT 45.8 06/08/2020    MCV 86.5 06/08/2020    RDW 15.2 06/08/2020     06/08/2020       CMP:   Lab Results   Component Value Date     06/08/2020    K 4.0 06/08/2020     06/08/2020    CO2 24 06/08/2020    BUN 12 06/08/2020    CREATININE 0.7 06/08/2020    GFRAA 101 01/27/2020    AGRATIO 1.7 06/08/2020    LABGLOM 84 06/08/2020    GLUCOSE 112 06/08/2020    PROT 6.5 06/08/2020    CALCIUM 9.1 06/08/2020    BILITOT 1.3 06/08/2020    ALKPHOS 63 06/08/2020    AST 22 06/08/2020    ALT 25 06/08/2020       POC Tests: No results for input(s): POCGLU, POCNA, POCK, POCCL, POCBUN, POCHEMO, POCHCT in the last 72 hours. Coags: No results found for: PROTIME, INR, APTT    HCG (If Applicable): No results found for: PREGTESTUR, PREGSERUM, HCG, HCGQUANT     ABGs: No results found for: PHART, PO2ART, VZR7KBZ, FHY6OUN, BEART, P4BSMFLF     Type & Screen (If Applicable):  No results found for: LABABO, LABRH    Drug/Infectious Status (If Applicable):  No results found for: HIV, HEPCAB    COVID-19 Screening (If Applicable):   Lab Results   Component Value Date    COVID19 Not Detected 03/10/2021           Anesthesia Evaluation  Patient summary reviewed and Nursing notes reviewed no history of anesthetic complications:   Airway: Mallampati: III        Dental: normal exam         Pulmonary: breath sounds clear to auscultation  (+) asthma:                            Cardiovascular:Negative CV ROS            Rhythm: regular  Rate: normal                    Neuro/Psych:   (+) depression/anxiety             GI/Hepatic/Renal:   (+) GERD:,          ROS comment: Possible esophageal stricture. Endo/Other: Negative Endo/Other ROS                    Abdominal:           Vascular: negative vascular ROS. Anesthesia Plan      MAC     ASA 3             Anesthetic plan and risks discussed with patient and spouse. Plan discussed with CRNA. Sravan Cook DO   3/15/2021        Patient seen and examined, chart reviewed, agree with above findings. Anesthetic plan, risks, benefits, alternatives, and personnel involved discussed with patient. Patient verbalized an understanding and agreed to proceed. NPO status confirmed. Anesthetic plan discussed with care team members and agreed upon.     Sravan Cook DO   3/15/2021  8:27 AM

## 2021-03-15 NOTE — ANESTHESIA POSTPROCEDURE EVALUATION
Department of Anesthesiology  Postprocedure Note    Patient: Robert Chisholm  MRN: [id number]  YOB: 1955  Date of evaluation: 3/15/2021  Time:  1:52 PM     Procedure Summary     Date: 03/15/21 Room / Location: [facility] / [facility]    Anesthesia Start: 8366 Anesthesia Stop: 9527    Procedure: EGD ESOPHAGOGASTRODUODENOSCOPY (N/A ) Diagnosis: (DYSPHAGIA)    Surgeons: Nicholas Chino MD Responsible Provider: Nancie Frost DO    Anesthesia Type: MAC ASA Status: 3          Anesthesia Type: MAC    Abel Phase I: Abel Score: 10    Abel Phase II: Abel Score: 10    Last vitals: Reviewed and per EMR flowsheets.        Anesthesia Post Evaluation    Patient location during evaluation: PACU  Patient participation: complete - patient participated  Level of consciousness: awake and alert  Pain score: 1  Airway patency: patent  Nausea & Vomiting: no nausea and no vomiting  Complications: no  Cardiovascular status: hemodynamically stable  Respiratory status: acceptable  Hydration status: euvolemic

## 2021-03-15 NOTE — INTERVAL H&P NOTE
Update History & Physical    The patient's History and Physical of March 12, 2020 was reviewed with the patient and I examined the patient. There was no change. The surgical site was confirmed by the patient and me. Plan: The risks, benefits, expected outcome, and alternative to the recommended procedure have been discussed with the patient. Patient understands and wants to proceed with the procedure.      Electronically signed by Mike Mora MD on 3/15/2021 at 9:44 AM

## 2021-03-15 NOTE — OP NOTE
[address] [address]                                OPERATIVE REPORT    PATIENT NAME: Jodi Levi                     :        1955  MED REC NO:   [id number]                            ROOM:  ACCOUNT NO:   [de-identified]                           ADMIT DATE: 03/15/2021  PROVIDER:     Nixon Hoyos MD    DATE OF PROCEDURE:  03/15/2021    PROCEDURE PERFORMED:  Esophagogastroduodenoscopy. PREOPERATIVE DIAGNOSIS:  Dysphagia, suggested motility disorder. POSTOPERATIVE DIAGNOSES:  1. Some mucus in the esophagus, but otherwise a rather unremarkable  exam with no evidence of esophageal stricture or ring. 2.  Type 1 hiatal hernia, moderate. 3.  His CT shows normal stomach and proximal duodenum. SURGEON:  Nixon Hoyos M.D. INDICATIONS:  She is 65. She has complaints of dysphagia. Historically  was evaluated endoscopically elsewhere with esophageal dilatation, but  those records not available. An esophagram suggest primary motility  problem, cannot rule out type 2 achalasia. Diffuse esophageal spasm  more likely. Preop is monitored anesthesia care. DESCRIPTION OF PROCEDURE:  With her in the left lateral decubitus  position, sedation was given. A bite-block was placed. The Olympus  GIF-H190 video endoscope was inserted into the cervical esophagus under  direct vision. The content of the esophagus was mostly mucus and  minimal in amount. Easily cleared. Esophageal dilatation was not  apparent. The instrument was passed distally. The gastroesophageal  junction was without inflammation stricture or identifiable ring. Contrary to the radiology, there was at least a 4 cm hiatal hernia. Fundus and cardia unremarkable. The pylorus, bulb, and postbulbar  duodenum were normal.  No abnormalities were noted within the gastric  chamber. The endoscope was retrieved. Stomach was decompressed.

## 2021-11-23 ENCOUNTER — OFFICE VISIT (OUTPATIENT)
Dept: FAMILY MEDICINE CLINIC | Age: 66
End: 2021-11-23
Payer: MEDICARE

## 2021-11-23 VITALS
HEIGHT: 61 IN | HEART RATE: 98 BPM | BODY MASS INDEX: 38.14 KG/M2 | DIASTOLIC BLOOD PRESSURE: 86 MMHG | WEIGHT: 202 LBS | TEMPERATURE: 97.3 F | OXYGEN SATURATION: 97 % | RESPIRATION RATE: 18 BRPM | SYSTOLIC BLOOD PRESSURE: 126 MMHG

## 2021-11-23 DIAGNOSIS — M25.562 ACUTE PAIN OF LEFT KNEE: ICD-10-CM
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
## 2021-11-23 PROCEDURE — G8484 FLU IMMUNIZE NO ADMIN: HCPCS | Performed by: NURSE PRACTITIONER

## 2021-11-23 PROCEDURE — 3017F COLORECTAL CA SCREEN DOC REV: CPT | Performed by: NURSE PRACTITIONER

## 2021-11-23 PROCEDURE — G8427 DOCREV CUR MEDS BY ELIG CLIN: HCPCS | Performed by: NURSE PRACTITIONER

## 2021-11-23 PROCEDURE — G8400 PT W/DXA NO RESULTS DOC: HCPCS | Performed by: NURSE PRACTITIONER

## 2021-11-23 PROCEDURE — 1090F PRES/ABSN URINE INCON ASSESS: CPT | Performed by: NURSE PRACTITIONER

## 2021-11-23 PROCEDURE — 4040F PNEUMOC VAC/ADMIN/RCVD: CPT | Performed by: NURSE PRACTITIONER

## 2021-11-23 PROCEDURE — 99214 OFFICE O/P EST MOD 30 MIN: CPT | Performed by: NURSE PRACTITIONER

## 2021-11-23 PROCEDURE — G8417 CALC BMI ABV UP PARAM F/U: HCPCS | Performed by: NURSE PRACTITIONER

## 2021-11-23 PROCEDURE — 1036F TOBACCO NON-USER: CPT | Performed by: NURSE PRACTITIONER

## 2021-11-23 PROCEDURE — 1123F ACP DISCUSS/DSCN MKR DOCD: CPT | Performed by: NURSE PRACTITIONER

## 2021-11-23 RX ORDER — DILTIAZEM HYDROCHLORIDE 60 MG/1
TABLET, FILM COATED ORAL
COMMUNITY
End: 2022-04-11 | Stop reason: CLARIF

## 2021-11-23 RX ORDER — ROPINIROLE 0.5 MG/1
TABLET, FILM COATED ORAL
COMMUNITY
Start: 2021-11-09 | End: 2022-08-01

## 2021-11-23 NOTE — PROGRESS NOTES
Chief Complaint:   Knee Pain (Fall on Sunday landing on left knee and shoulder. Shoulder pain improved knee pain persistent.)    History of Present Illness   Source of history provided by:  patient. Jessy Ross is a 74 y.o. old female who presents to the walk-in clinic, for a fall which occured 3 day(s) prior to arrival. She reportedly tripped over object while at home prior to incident with complaints of left pain. Since onset the symptoms have been worsening. Her pain is aggraveated by certain movements and relieved by rest of injured area. She denies any head injury, loss of consciousness, neck pain, chest pain, back pain, numbness, blurred vision or nausea. She takes ASA. ROS    Unless otherwise stated in this report or unable to obtain because of the patient's clinical or mental status as evidenced by the medical record, this patients's positive and negative responses for Review of Systems, constitutional, psych, eyes, ENT, cardiovascular, respiratory, gastrointestinal, neurological, genitourinary, musculoskeletal, integument systems and systems related to the presenting problem are either stated in the preceding or were not pertinent or were negative for the symptoms and/or complaints related to the medical problem. Past Medical History:  has a past medical history of Abdominal wound dehiscence, Anxiety, Arthritis, Depression, Difficulty swallowing, Fibromyalgia, History of colon polyps, Hyperlipidemia, Lumbar spinal stenosis, Mild intermittent asthma, Obesity, Osteopenia, Pulmonary nodules, Vitamin B12 deficiency, and Vitamin D deficiency. Past Surgical History:  has a past surgical history that includes lipectomy; Abdomen surgery (04/2013); hernia repair; Ankle surgery (Bilateral); Hysterectomy; Tonsillectomy; Breast reduction surgery (Bilateral); Breast surgery (Left, 06/2013); Cholecystectomy, laparoscopic;  Rectal prolapse repair; and Upper gastrointestinal endoscopy (N/A, 3/15/2021). Social History:  reports that she has never smoked. She has never used smokeless tobacco. She reports that she does not drink alcohol and does not use drugs. Family History: family history includes Breast Cancer in her mother; Diabetes type 2  in her father; Other in an other family member. Allergies: Promethazine-phenylephrine    Physical Exam   Vital Signs:  /86   Pulse 98   Temp 97.3 °F (36.3 °C)   Resp 18   Ht 5' 1\" (1.549 m)   Wt 202 lb (91.6 kg)   SpO2 97%   BMI 38.17 kg/m²    Oxygen Saturation Interpretation: Normal.    Constitutional:  Alert, development consistent with age. HEENT:  NC/NT. Airway patent. Neck:  No midline or paravertebral tenderness. Normal ROM. Supple. Chest:  Symmetrical without visible rash or tenderness. Respiratory:  Lungs Clear to auscultation and breath sounds equal.  CV:  Regular rate and rhythm, normal heart sounds, without pathological murmurs, ectopy, gallops, or rubs. GI:  Abdomen Soft, nontender, good bowel sounds. No firm or pulsatile mass. Pelvis:  Stable, nontender to palpation. Back:  No midline or paravertebral tenderness. No costovertebral tenderness. Extremities: Full range of motion of all extremities. Mild tenderness with range of motion of the left knee. No laxity of ligaments of the left knee. Mild edema noted to patella. Integument:  Normal turgor. Warm, dry, without visible rash, unless noted elsewhere. Lymphatic: no lymphadenopathy noted  Neurological:  Oriented x3, GCS 15. Motor functions intact. Lab / Imaging Results   (All laboratory and radiology results have been personally reviewed by myself)  Labs:  No results found for this visit on 11/23/21. Imaging: All Radiology results interpreted by Radiologist unless otherwise noted.   XR KNEE LEFT (MIN 4 VIEWS)    Result Date: 11/24/2021  EXAMINATION: FOUR XRAY VIEWS OF THE LEFT KNEE 11/23/2021 2:59 pm COMPARISON: 23 November 2010 HISTORY: [address] PROVIDED HISTORY: Fall, initial encounter TECHNOLOGIST PROVIDED HISTORY: Reason for exam:->pain s/p fall FINDINGS: Tricompartmental osteoarthritis with moderate to severe findings at the medial weight-bearing compartment. No fracture or dislocation. Normal soft tissues. Tricompartmental osteoarthritis with moderate to severe findings at the medial weight-bearing compartment. Medical Decision Making   MDM:   49-year-old female who presents today for left knee pain after a fall 3 days ago. She reports she slipped on a wet surface falling forward landing on her left knee and left shoulder. On physical examination good full range of motion with left shoulder. There is moderate pain with range of motion of left knee. There is swelling noted to the left knee. No laxity of ligaments of the left knee. X-ray of left knee was obtained in office showing tricompartmental osteoarthritis with moderate to severe findings at the medial weightbearing compartment. She was advised these results. She was advised Tylenol ibuprofen as needed for pain. RICE protocol was advised. She has a follow-up with her PCP if symptoms persist.  ER symptoms change or worsen. Red flag symptoms discussed with patient. Patient verbalized understanding agreeable plan of care. All questions were answered. Assessment / Plan   Impression(s):  Naun Soares was seen today for knee pain. Diagnoses and all orders for this visit:    Fall, initial encounter  -     XR KNEE LEFT (MIN 4 VIEWS); Future    Acute pain of left knee  -     XR KNEE LEFT (MIN 4 VIEWS); Future    Discharged home. Patient condition is good    Return if symptoms worsen or fail to improve. New Medications     New Prescriptions    No medications on file       Electronically signed by CARRIE Mata CNP   DD: 11/23/21    **This report was transcribed using voice recognition software.  Every effort was made to ensure accuracy; however, inadvertent computerized

## 2022-04-11 ENCOUNTER — OFFICE VISIT (OUTPATIENT)
Dept: CARDIOLOGY CLINIC | Age: 67
End: 2022-04-11
Payer: MEDICARE

## 2022-04-11 VITALS
HEIGHT: 61 IN | BODY MASS INDEX: 39.88 KG/M2 | SYSTOLIC BLOOD PRESSURE: 169 MMHG | HEART RATE: 97 BPM | WEIGHT: 211.2 LBS | DIASTOLIC BLOOD PRESSURE: 77 MMHG | RESPIRATION RATE: 18 BRPM

## 2022-04-11 DIAGNOSIS — Z01.810 PREOP CARDIOVASCULAR EXAM: Primary | ICD-10-CM

## 2022-04-11 DIAGNOSIS — I49.3 PVC'S (PREMATURE VENTRICULAR CONTRACTIONS): ICD-10-CM

## 2022-04-11 DIAGNOSIS — M25.569 KNEE PAIN, UNSPECIFIED CHRONICITY, UNSPECIFIED LATERALITY: ICD-10-CM

## 2022-04-11 DIAGNOSIS — G47.33 OSA (OBSTRUCTIVE SLEEP APNEA): ICD-10-CM

## 2022-04-11 PROCEDURE — 93000 ELECTROCARDIOGRAM COMPLETE: CPT | Performed by: INTERNAL MEDICINE

## 2022-04-11 PROCEDURE — 1036F TOBACCO NON-USER: CPT | Performed by: INTERNAL MEDICINE

## 2022-04-11 PROCEDURE — 99204 OFFICE O/P NEW MOD 45 MIN: CPT | Performed by: INTERNAL MEDICINE

## 2022-04-11 PROCEDURE — G8427 DOCREV CUR MEDS BY ELIG CLIN: HCPCS | Performed by: INTERNAL MEDICINE

## 2022-04-11 PROCEDURE — G8400 PT W/DXA NO RESULTS DOC: HCPCS | Performed by: INTERNAL MEDICINE

## 2022-04-11 PROCEDURE — 4040F PNEUMOC VAC/ADMIN/RCVD: CPT | Performed by: INTERNAL MEDICINE

## 2022-04-11 PROCEDURE — G8417 CALC BMI ABV UP PARAM F/U: HCPCS | Performed by: INTERNAL MEDICINE

## 2022-04-11 PROCEDURE — 3017F COLORECTAL CA SCREEN DOC REV: CPT | Performed by: INTERNAL MEDICINE

## 2022-04-11 PROCEDURE — 1090F PRES/ABSN URINE INCON ASSESS: CPT | Performed by: INTERNAL MEDICINE

## 2022-04-11 PROCEDURE — 1123F ACP DISCUSS/DSCN MKR DOCD: CPT | Performed by: INTERNAL MEDICINE

## 2022-04-11 RX ORDER — ASCORBIC ACID 500 MG
500 TABLET ORAL DAILY
COMMUNITY

## 2022-04-11 NOTE — PROGRESS NOTES
Chief Complaint   Patient presents with    Cardiac Clearance    Irregular Heart Beat       Patient Active Problem List    Diagnosis Date Noted    PVC's (premature ventricular contractions) 04/11/2022    PATRICK (obstructive sleep apnea) 04/11/2022    Depression      Overview Note:     STABLE WITH MEDS PER PT      Arthritis      Overview Note:     OSTEO      Hyperlipidemia     Anxiety     Fibromyalgia     Lumbar spinal stenosis     Mild intermittent asthma     Pulmonary nodules     Osteopenia     Obesity        Current Outpatient Medications   Medication Sig Dispense Refill    vitamin C (ASCORBIC ACID) 500 MG tablet Take 500 mg by mouth daily      rOPINIRole (REQUIP) 0.5 MG tablet TAKE ONE TABLET BY MOUTH EVERY DAY AT BEDTIME      DULoxetine (CYMBALTA) 60 MG extended release capsule Take 60 mg by mouth daily Instructed to take morning of surgery with a sip of water      naproxen (NAPROSYN) 500 MG tablet Take 500 mg by mouth daily To verify last dose w dr Giang cyclobenzaprine (FLEXERIL) 10 MG tablet Tale 1.5 tabs nightly 135 tablet 2    omeprazole (PRILOSEC) 20 MG delayed release capsule Take 1 capsule by mouth Daily 90 capsule 1    aspirin (SB LOW DOSE ASA EC) 81 MG EC tablet Take by mouth To verify last dose w dr Giang vitamin B-12 (CYANOCOBALAMIN) 1000 MCG tablet Take 1,000 mcg by mouth daily       Cholecalciferol (VITAMIN D3) 1000 units CAPS Take 2,000 Units by mouth daily        No current facility-administered medications for this visit.         Allergies   Allergen Reactions    Promethazine-Phenylephrine      RESTLESS LEGS AND EMISIS        PHENARGAN       Vitals:    04/11/22 1216   BP: (!) 169/77   Pulse: 97   Resp: 18   Weight: 211 lb 3.2 oz (95.8 kg)   Height: 5' 1\" (1.549 m)       Social History     Socioeconomic History    Marital status:      Spouse name: Not on file    Number of children: Not on file    Years of education: Not on file    Highest education level: Not on file Occupational History    Occupation: retired [employer]    Smoking status: Never Smoker    Smokeless tobacco: Never Used   Vaping Use    Vaping Use: Never used   Substance and Sexual Activity    Alcohol use: No    Drug use: No    Sexual activity: Not on file   Other Topics Concern    Not on file   Social History Narrative    Not on file     Social Determinants of Health     Financial Resource Strain:     Difficulty of Paying Living Expenses: Not on file   Food Insecurity:     Worried About [address] in the Last Year: Not on file    Cathy of Food in the Last Year: Not on file   Transportation Needs:     Lack of Transportation (Medical): Not on file    Lack of Transportation (Non-Medical): Not on file   Physical Activity:     Days of Exercise per Week: Not on file    Minutes of Exercise per Session: Not on file   Stress:     Feeling of Stress : Not on file   Social Connections:     Frequency of Communication with Friends and Family: Not on file    Frequency of Social Gatherings with Friends and Family: Not on file    Attends Restoration Services: Not on file    Active Member of [address] [place] [address] or Organizations: Not on file    Attends Club or Organization Meetings: Not on file    Marital Status: Not on file   Intimate Partner Violence:     Fear of Current or Ex-Partner: Not on file    Emotionally Abused: Not on file    Physically Abused: Not on file    Sexually Abused: Not on file   Housing Stability:     Unable to Pay for Housing in the Last Year: Not on file    Number of [place] in the Last Year: Not on file    Unstable Housing in the Last Year: Not on file       Family History   Problem Relation Age of Onset    Breast Cancer Mother     Diabetes type 2  Father     Other Other         no siblings         SUBJECTIVE: Toñito July presents to the office today for consult for pre-op evaluation prior to knee surgery with Dr. Brooke Zarco.   Had the other knee done in 7542 with no complications - had normal pre-op stress at that time. She complains of no new or unstable cardiac symptoms,  and denies dyspnea, exertional chest pressure/discomfort, fatigue, irregular heart beat, lower extremity edema, near-syncope, orthopnea, palpitations, paroxysmal nocturnal dyspnea, syncope and tachypnea. Can still do all household chores, including climbing two flights of stairs and walking two blocks. Has PATRICK but intolerant of CPAP  Home BP readings 130/80      Review of Systems:   Heart: as above   Lungs: as above   Eyes: denies changes in vision or discharge. Ears: denies changes in hearing or pain. Nose: denies epistaxis or masses   Throat: denies sore throat or trouble swallowing. Neuro: denies numbness, tingling, tremors. Skin: denies rashes or itching. : denies hematuria, dysuria   GI: denies vomiting, diarrhea   Psych: denies mood changed, anxiety, depression. all others negative. Physical Exam   BP (!) 169/77   Pulse 97   Resp 18   Ht 5' 1\" (1.549 m)   Wt 211 lb 3.2 oz (95.8 kg)   BMI 39.91 kg/m²   Constitutional: Oriented to person, place, and time. Obese No distress. Head: Normocephalic and atraumatic. Eyes: EOM are normal. Pupils are equal, round, and reactive to light. Neck: Normal range of motion. Neck supple. No hepatojugular reflux and no JVD present. Carotid bruit is not present. Cardiovascular: Normal rate, regular rhythm, normal heart sounds and intact distal pulses. Exam reveals no gallop and no friction rub. No murmur heard. Pulmonary/Chest: Effort normal and breath sounds normal. No respiratory distress. No wheezes. No rales. Abdominal: Soft. Bowel sounds are normal. No distension and no mass. No tenderness. No rebound and no guarding. Musculoskeletal:  No edema and no tenderness. Neurological: Alert and oriented to person, place, and time. Skin: Skin is warm and dry. No rash noted. Not diaphoretic. No erythema.    Psychiatric: Normal mood and affect. Behavior is normal.     EKG:  normal sinus rhythm, frequent PVC's noted. ASSESSMENT AND PLAN:  Patient Active Problem List   Diagnosis    Depression    Arthritis    Hyperlipidemia    Anxiety    Fibromyalgia    Lumbar spinal stenosis    Mild intermittent asthma    Pulmonary nodules    Osteopenia    Obesity    PVC's (premature ventricular contractions)    PATRICK (obstructive sleep apnea)       ·  Patient has no high risk clinical predictors of an adverse outcome in surgery, such as recent myocardial infarction, unstable angina, heart failure, severe obstructive valvular disease,cva, insulin, ckd  · Able to achieve 4 METS with AODLs  · Normal CV exam   · EKG with asymptomatic PVCs, possibly due to untreated PATRICK  · RCRI Class I risk (< 1% chance of cardiac complication).        Cassy Neal M.D  [facility]

## 2022-04-12 ENCOUNTER — PROCEDURE VISIT (OUTPATIENT)
Dept: PODIATRY | Age: 67
End: 2022-04-12
Payer: MEDICARE

## 2022-04-12 VITALS
WEIGHT: 211 LBS | TEMPERATURE: 98.2 F | DIASTOLIC BLOOD PRESSURE: 78 MMHG | SYSTOLIC BLOOD PRESSURE: 126 MMHG | BODY MASS INDEX: 39.87 KG/M2

## 2022-04-12 DIAGNOSIS — M79.675 PAIN IN LEFT TOE(S): ICD-10-CM

## 2022-04-12 DIAGNOSIS — M79.674 PAIN IN TOE OF RIGHT FOOT: ICD-10-CM

## 2022-04-12 DIAGNOSIS — M20.41 HAMMER TOES OF BOTH FEET: Primary | ICD-10-CM

## 2022-04-12 DIAGNOSIS — B35.1 TINEA UNGUIUM: ICD-10-CM

## 2022-04-12 DIAGNOSIS — M20.42 HAMMER TOES OF BOTH FEET: Primary | ICD-10-CM

## 2022-04-12 PROCEDURE — 3017F COLORECTAL CA SCREEN DOC REV: CPT | Performed by: PODIATRIST

## 2022-04-12 PROCEDURE — 1090F PRES/ABSN URINE INCON ASSESS: CPT | Performed by: PODIATRIST

## 2022-04-12 PROCEDURE — 1036F TOBACCO NON-USER: CPT | Performed by: PODIATRIST

## 2022-04-12 PROCEDURE — 99213 OFFICE O/P EST LOW 20 MIN: CPT | Performed by: PODIATRIST

## 2022-04-12 PROCEDURE — G8400 PT W/DXA NO RESULTS DOC: HCPCS | Performed by: PODIATRIST

## 2022-04-12 PROCEDURE — 1123F ACP DISCUSS/DSCN MKR DOCD: CPT | Performed by: PODIATRIST

## 2022-04-12 PROCEDURE — 4040F PNEUMOC VAC/ADMIN/RCVD: CPT | Performed by: PODIATRIST

## 2022-04-12 PROCEDURE — G8417 CALC BMI ABV UP PARAM F/U: HCPCS | Performed by: PODIATRIST

## 2022-04-12 PROCEDURE — G8427 DOCREV CUR MEDS BY ELIG CLIN: HCPCS | Performed by: PODIATRIST

## 2022-08-01 ENCOUNTER — PROCEDURE VISIT (OUTPATIENT)
Dept: PODIATRY | Age: 67
End: 2022-08-01
Payer: MEDICARE

## 2022-08-01 VITALS
BODY MASS INDEX: 39.87 KG/M2 | DIASTOLIC BLOOD PRESSURE: 84 MMHG | TEMPERATURE: 97.4 F | WEIGHT: 211 LBS | SYSTOLIC BLOOD PRESSURE: 144 MMHG

## 2022-08-01 DIAGNOSIS — I73.9 PERIPHERAL VASCULAR DISEASE, UNSPECIFIED (HCC): ICD-10-CM

## 2022-08-01 DIAGNOSIS — S92.535A CLOSED NONDISPLACED FRACTURE OF DISTAL PHALANX OF LESSER TOE OF LEFT FOOT, INITIAL ENCOUNTER: Primary | ICD-10-CM

## 2022-08-01 DIAGNOSIS — B35.1 TINEA UNGUIUM: ICD-10-CM

## 2022-08-01 DIAGNOSIS — M79.675 PAIN IN LEFT TOE(S): ICD-10-CM

## 2022-08-01 DIAGNOSIS — M79.674 PAIN IN TOE OF RIGHT FOOT: ICD-10-CM

## 2022-08-01 PROCEDURE — 99213 OFFICE O/P EST LOW 20 MIN: CPT | Performed by: PODIATRIST

## 2022-08-01 PROCEDURE — 11721 DEBRIDE NAIL 6 OR MORE: CPT | Performed by: PODIATRIST

## 2022-08-01 PROCEDURE — 1123F ACP DISCUSS/DSCN MKR DOCD: CPT | Performed by: PODIATRIST

## 2022-08-01 PROCEDURE — 1090F PRES/ABSN URINE INCON ASSESS: CPT | Performed by: PODIATRIST

## 2022-08-01 PROCEDURE — 1036F TOBACCO NON-USER: CPT | Performed by: PODIATRIST

## 2022-08-01 PROCEDURE — G8427 DOCREV CUR MEDS BY ELIG CLIN: HCPCS | Performed by: PODIATRIST

## 2022-08-01 PROCEDURE — 3017F COLORECTAL CA SCREEN DOC REV: CPT | Performed by: PODIATRIST

## 2022-08-01 PROCEDURE — G8400 PT W/DXA NO RESULTS DOC: HCPCS | Performed by: PODIATRIST

## 2022-08-01 PROCEDURE — G8417 CALC BMI ABV UP PARAM F/U: HCPCS | Performed by: PODIATRIST

## 2022-08-01 RX ORDER — CYCLOBENZAPRINE HCL 10 MG
10 TABLET ORAL 3 TIMES DAILY PRN
COMMUNITY

## 2022-08-01 RX ORDER — MIRTAZAPINE 15 MG/1
TABLET, FILM COATED ORAL
COMMUNITY
Start: 2022-06-07

## 2022-08-01 NOTE — PROGRESS NOTES
Roscoe Pappas  Return Patient    Chief Complaint   Patient presents with    Toe Pain     Saw pcp Dr. Lydia Brooke 7/22/22       Subjective: This Lois Lien comes to office for foot and nail care. Pt currently has complaint of thickened, painful, elongated nails that he/she cannot manage by themselves. Pt. Relates pain to nails with shoe gear. Pt's primary care physician is Kaila Burton MD. patient has a new issue patient dropped a brick on her little toe left foot 1 week ago. Past Medical History:   Diagnosis Date    Abdominal wound dehiscence 2013    Anxiety     Arthritis     OSTEO    Depression     STABLE WITH MEDS PER PT    Difficulty swallowing     Fibromyalgia     History of colon polyps     Hyperlipidemia     diet controlled    Lumbar spinal stenosis     Mild intermittent asthma     Obesity     Osteopenia     Pulmonary nodules     Vitamin B12 deficiency     Vitamin D deficiency        Allergies   Allergen Reactions    Promethazine-Phenylephrine      RESTLESS LEGS AND EMISIS        PHENARGAN     Current Outpatient Medications on File Prior to Visit   Medication Sig Dispense Refill    mirtazapine (REMERON) 15 MG tablet TAKE ONE TABLET BY MOUTH EVERY NIGHT AT BEDTIME      cyclobenzaprine (FLEXERIL) 10 MG tablet Take 10 mg by mouth 3 times daily as needed for Muscle spasms      vitamin C (ASCORBIC ACID) 500 MG tablet Take 500 mg by mouth daily      omeprazole (PRILOSEC) 20 MG delayed release capsule Take 1 capsule by mouth Daily 90 capsule 1    aspirin 81 MG EC tablet Take by mouth To verify last dose w       vitamin B-12 (CYANOCOBALAMIN) 1000 MCG tablet Take 1,000 mcg by mouth daily       Cholecalciferol (VITAMIN D3) 1000 units CAPS Take 2,000 Units by mouth daily        No current facility-administered medications on file prior to visit. Review of Systems  Objective:  General: AAO x 3 in NAD.     Derm  Toenail Description  Sites of Onychomycosis Involvement (Check affected area)  [x] [x] [x] [x] [x] [x] [x] [x] [x] [x]  5 4 3 2 1 1 2 3 4 5                          Right                                        Left    Thickness  [x] [x] [x] [x] [x] [x] [x] [x] [x] [x]  5 4 3 2 1 1 2 3 4 5                         Right                                        Left    Dystrophic Changes   [x] [x] [x] [x] [x] [x] [x] [x] [x] [x]  5 4 3 2 1 1 2 3 4 5                         Right                                        Left    Color  [x] [x] [x] [x] [x] [x] [x] [x] [x] [x]  5 4 3 2 1 1 2 3 4 5                          Right                                        Left    Incurvation/Ingrowin   [x] [x] [x] [x] [x] [x] [x] [x] [x] [x]  5 4 3 2 1 1 2 3 4 5                         Right                                        Left    Inflammation/Pain   [x] [x] [x] [x] [x] [x] [x] [x] [x] [x]  5 4 3  2 1 1 2 3 4 5                         Right                                        Left      Nails that are described above are all elongated thickened pitting mycotic yellowish incurvated causing pain with both shoe gear. Palpation nails greater then 3 mm thick painful       Dermatologic Exam:hair loss noted  lower extremity    Skin lesion/ulceration   Skin   Callus   Musculoskeletal:   Fifth digit left foot is ecchymotic tender mildly contracted swollen.   1st MPJ ROM normal, Bilateral.  Muscle strength 5/5, Bilateral.  Pain present upon palpation of toenails 1-5    Bilateral., Bilateral.  Ankle ROM normal,Bilateral.    Dorsally contracted digits , Bilateral.     Vascular:  Pulses   bilateral DP     PT    Neurological:  Sensation present to light touch to level of digits, Bilateral.    Foot Exam    General  General Appearance: appears stated age and healthy   Orientation: alert and oriented to person, place, and time       Right Foot/Ankle     Neurovascular  Dorsalis pedis: absent  Posterior tibial: 1+      Left Foot/Ankle      Inspection and Palpation  Hammertoes: second toe  Claw toes: second

## 2022-10-10 ENCOUNTER — OFFICE VISIT (OUTPATIENT)
Dept: FAMILY MEDICINE CLINIC | Age: 67
End: 2022-10-10
Payer: MEDICARE

## 2022-10-10 VITALS
HEART RATE: 84 BPM | TEMPERATURE: 97.6 F | DIASTOLIC BLOOD PRESSURE: 78 MMHG | SYSTOLIC BLOOD PRESSURE: 134 MMHG | OXYGEN SATURATION: 98 % | HEIGHT: 61 IN | WEIGHT: 208.6 LBS | BODY MASS INDEX: 39.38 KG/M2 | RESPIRATION RATE: 18 BRPM
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
## 2022-10-10 PROCEDURE — 1090F PRES/ABSN URINE INCON ASSESS: CPT | Performed by: STUDENT IN AN ORGANIZED HEALTH CARE EDUCATION/TRAINING PROGRAM

## 2022-10-10 PROCEDURE — 1036F TOBACCO NON-USER: CPT | Performed by: STUDENT IN AN ORGANIZED HEALTH CARE EDUCATION/TRAINING PROGRAM

## 2022-10-10 PROCEDURE — 3017F COLORECTAL CA SCREEN DOC REV: CPT | Performed by: STUDENT IN AN ORGANIZED HEALTH CARE EDUCATION/TRAINING PROGRAM

## 2022-10-10 PROCEDURE — 99213 OFFICE O/P EST LOW 20 MIN: CPT | Performed by: STUDENT IN AN ORGANIZED HEALTH CARE EDUCATION/TRAINING PROGRAM

## 2022-10-10 PROCEDURE — G8400 PT W/DXA NO RESULTS DOC: HCPCS | Performed by: STUDENT IN AN ORGANIZED HEALTH CARE EDUCATION/TRAINING PROGRAM

## 2022-10-10 PROCEDURE — G8427 DOCREV CUR MEDS BY ELIG CLIN: HCPCS | Performed by: STUDENT IN AN ORGANIZED HEALTH CARE EDUCATION/TRAINING PROGRAM

## 2022-10-10 PROCEDURE — 1123F ACP DISCUSS/DSCN MKR DOCD: CPT | Performed by: STUDENT IN AN ORGANIZED HEALTH CARE EDUCATION/TRAINING PROGRAM

## 2022-10-10 PROCEDURE — G8484 FLU IMMUNIZE NO ADMIN: HCPCS | Performed by: STUDENT IN AN ORGANIZED HEALTH CARE EDUCATION/TRAINING PROGRAM

## 2022-10-10 PROCEDURE — G8417 CALC BMI ABV UP PARAM F/U: HCPCS | Performed by: STUDENT IN AN ORGANIZED HEALTH CARE EDUCATION/TRAINING PROGRAM

## 2022-10-10 RX ORDER — LIDOCAINE 50 MG/G
1 PATCH TOPICAL DAILY
Qty: 10 PATCH | Refills: 0 | Status: SHIPPED | OUTPATIENT
Start: 2022-10-10 | End: 2022-10-20

## 2022-10-10 RX ORDER — PSEUDOEPHEDRINE HCL 30 MG
TABLET ORAL
COMMUNITY
Start: 2022-04-16

## 2022-10-10 RX ORDER — VIBEGRON 75 MG/1
TABLET, FILM COATED ORAL
COMMUNITY
Start: 2022-07-06

## 2022-10-10 ASSESSMENT — ENCOUNTER SYMPTOMS
RHINORRHEA: 0
NAUSEA: 0
COUGH: 0
ABDOMINAL PAIN: 0
SHORTNESS OF BREATH: 0
VOMITING: 0

## 2022-10-10 NOTE — PROGRESS NOTES
Neema Mccloud (:  1955) is a 69 y.o. female,Established patient, here for evaluation of the following chief complaint(s):  Fall (Patient states that she fell yesterday on her dock landed on chest on the boards. . knee pain went away. . having some discomfort to the right side of chest )         ASSESSMENT/PLAN:  1. Fall, initial encounter  -     XR RIBS RIGHT INCLUDE CHEST (MIN 3 VIEWS); Future  -     lidocaine (LIDODERM) 5 %; Place 1 patch onto the skin daily for 10 days 12 hours on, 12 hours off., Disp-10 patch, R-0Print  XR to eval for fracture. Pain control with lidocaine, tyelnol, motrin. Discussed alarm symptoms and when to get re-evaluated. Discussed return and ER precautions. Patient and or parent verbalized understanding. No follow-ups on file. Subjective   SUBJECTIVE/OBJECTIVE:  Chest pain after a fall  -tripped, fell straight on her chest  -having a lot of pain in the chest  -breathing is painful  -no coughing up blood  -pain is on the R breast  -no L sided pain      Review of Systems   Constitutional:  Negative for chills and fever. HENT:  Negative for congestion and rhinorrhea. Respiratory:  Negative for cough and shortness of breath. Cardiovascular:  Positive for chest pain (MSK/chest wall pain). Negative for leg swelling. Gastrointestinal:  Negative for abdominal pain, nausea and vomiting. Genitourinary:  Negative for dysuria and hematuria. Musculoskeletal:  Negative for arthralgias and myalgias. Skin:  Negative for rash and wound. Neurological:  Negative for dizziness and light-headedness. Objective   Physical Exam  Vitals reviewed. Constitutional:       General: She is not in acute distress. HENT:      Head: Normocephalic and atraumatic. Eyes:      Extraocular Movements: Extraocular movements intact. Conjunctiva/sclera: Conjunctivae normal.   Cardiovascular:      Rate and Rhythm: Normal rate and regular rhythm.    Pulmonary:      Effort: Pulmonary effort is normal.      Breath sounds: Normal breath sounds. No wheezing. Chest:      Chest wall: Tenderness present. Comments: Breath sounds present in all lung fields  Abdominal:      General: Abdomen is flat. There is no distension. Musculoskeletal:         General: No tenderness or deformity. Neurological:      General: No focal deficit present. Mental Status: She is alert and oriented to person, place, and time. An electronic signature was used to authenticate this note.     --Miller Lipscomb MD

## 2023-03-01 ENCOUNTER — HOSPITAL ENCOUNTER (OUTPATIENT)
Age: 68
Discharge: HOME | End: 2023-03-03

## 2023-03-28 ENCOUNTER — OFFICE VISIT (OUTPATIENT)
Dept: FAMILY MEDICINE CLINIC | Age: 68
End: 2023-03-28
Payer: MEDICARE

## 2023-03-28 VITALS
RESPIRATION RATE: 16 BRPM | TEMPERATURE: 98.8 F | DIASTOLIC BLOOD PRESSURE: 80 MMHG | HEART RATE: 92 BPM | SYSTOLIC BLOOD PRESSURE: 124 MMHG | OXYGEN SATURATION: 97 % | BODY MASS INDEX: 38.51 KG/M2 | HEIGHT: 61 IN | WEIGHT: 204 LBS

## 2023-03-28 DIAGNOSIS — S22.31XA CLOSED FRACTURE OF ONE RIB OF RIGHT SIDE, INITIAL ENCOUNTER: Primary | ICD-10-CM

## 2023-03-28 PROCEDURE — G8427 DOCREV CUR MEDS BY ELIG CLIN: HCPCS | Performed by: PHYSICIAN ASSISTANT

## 2023-03-28 PROCEDURE — 3017F COLORECTAL CA SCREEN DOC REV: CPT | Performed by: PHYSICIAN ASSISTANT

## 2023-03-28 PROCEDURE — 96372 THER/PROPH/DIAG INJ SC/IM: CPT | Performed by: PHYSICIAN ASSISTANT

## 2023-03-28 PROCEDURE — 1036F TOBACCO NON-USER: CPT | Performed by: PHYSICIAN ASSISTANT

## 2023-03-28 PROCEDURE — G8417 CALC BMI ABV UP PARAM F/U: HCPCS | Performed by: PHYSICIAN ASSISTANT

## 2023-03-28 PROCEDURE — 1090F PRES/ABSN URINE INCON ASSESS: CPT | Performed by: PHYSICIAN ASSISTANT

## 2023-03-28 PROCEDURE — 99213 OFFICE O/P EST LOW 20 MIN: CPT | Performed by: PHYSICIAN ASSISTANT

## 2023-03-28 PROCEDURE — G8400 PT W/DXA NO RESULTS DOC: HCPCS | Performed by: PHYSICIAN ASSISTANT

## 2023-03-28 PROCEDURE — G8484 FLU IMMUNIZE NO ADMIN: HCPCS | Performed by: PHYSICIAN ASSISTANT

## 2023-03-28 PROCEDURE — 1123F ACP DISCUSS/DSCN MKR DOCD: CPT | Performed by: PHYSICIAN ASSISTANT

## 2023-03-28 RX ORDER — METHYLPREDNISOLONE 4 MG/1
TABLET ORAL
Qty: 1 KIT | Refills: 0 | Status: SHIPPED | OUTPATIENT
Start: 2023-03-28 | End: 2023-03-28

## 2023-03-28 RX ORDER — TRIAMCINOLONE ACETONIDE 40 MG/ML
40 INJECTION, SUSPENSION INTRA-ARTICULAR; INTRAMUSCULAR ONCE
Status: COMPLETED | OUTPATIENT
Start: 2023-03-28 | End: 2023-03-28

## 2023-03-28 RX ORDER — METHYLPREDNISOLONE 4 MG/1
TABLET ORAL
Qty: 1 KIT | Refills: 0 | Status: SHIPPED
Start: 2023-03-28 | End: 2023-03-28

## 2023-03-28 RX ADMIN — TRIAMCINOLONE ACETONIDE 40 MG: 40 INJECTION, SUSPENSION INTRA-ARTICULAR; INTRAMUSCULAR at 14:23

## 2023-03-28 ASSESSMENT — PATIENT HEALTH QUESTIONNAIRE - PHQ9
2. FEELING DOWN, DEPRESSED OR HOPELESS: 0
5. POOR APPETITE OR OVEREATING: 0
SUM OF ALL RESPONSES TO PHQ QUESTIONS 1-9: 0
4. FEELING TIRED OR HAVING LITTLE ENERGY: 0
7. TROUBLE CONCENTRATING ON THINGS, SUCH AS READING THE NEWSPAPER OR WATCHING TELEVISION: 0
6. FEELING BAD ABOUT YOURSELF - OR THAT YOU ARE A FAILURE OR HAVE LET YOURSELF OR YOUR FAMILY DOWN: 0
SUM OF ALL RESPONSES TO PHQ QUESTIONS 1-9: 0
SUM OF ALL RESPONSES TO PHQ QUESTIONS 1-9: 0
8. MOVING OR SPEAKING SO SLOWLY THAT OTHER PEOPLE COULD HAVE NOTICED. OR THE OPPOSITE, BEING SO FIGETY OR RESTLESS THAT YOU HAVE BEEN MOVING AROUND A LOT MORE THAN USUAL: 0
1. LITTLE INTEREST OR PLEASURE IN DOING THINGS: 0
9. THOUGHTS THAT YOU WOULD BE BETTER OFF DEAD, OR OF HURTING YOURSELF: 0
3. TROUBLE FALLING OR STAYING ASLEEP: 0
SUM OF ALL RESPONSES TO PHQ9 QUESTIONS 1 & 2: 0
SUM OF ALL RESPONSES TO PHQ QUESTIONS 1-9: 0

## 2023-03-28 NOTE — PROGRESS NOTES
injection administered in office today, potential reactions discussed. X-rays of the right rib cage were also obtained in office today which did reveal the presence of a small nondisplaced fracture of the right sixth rib. Patient was initially prescribed a Medrol Dosepak but advised via phone to discontinue this as it will not help with a fractured rib. She was offered a short course of Norco but declined. Recommend Tylenol, rest, ice, and/or moist heat for symptomatic relief. PCP in 1-2 weeks for recheck if symptoms persist. ED sooner if symptoms worsen or change. ED immediately with severe or worsening pain, paresthesias, weakness, CP, or SOB. Pt states understanding and is in agreement with this care plan. All questions answered. Russell Medina PA-C    **This report was transcribed using voice recognition software. Every effort was made to ensure accuracy; however, inadvertent computerized transcription errors may be present.

## 2023-03-30 ENCOUNTER — TELEPHONE (OUTPATIENT)
Dept: FAMILY MEDICINE CLINIC | Age: 68
End: 2023-03-30

## 2023-03-30 DIAGNOSIS — S22.31XA CLOSED FRACTURE OF ONE RIB OF RIGHT SIDE, INITIAL ENCOUNTER: Primary | ICD-10-CM

## 2023-03-30 RX ORDER — HYDROCODONE BITARTRATE AND ACETAMINOPHEN 5; 325 MG/1; MG/1
1 TABLET ORAL EVERY 6 HOURS PRN
Qty: 12 TABLET | Refills: 0 | Status: SHIPPED | OUTPATIENT
Start: 2023-03-30 | End: 2023-04-02

## 2023-03-30 NOTE — TELEPHONE ENCOUNTER
You saw patient 3/28/23 and Dx w 3 rib Fx. Patient was told to call if she was having pain and you would call something in.      Patient is using [facility] in [place]

## 2023-03-30 NOTE — TELEPHONE ENCOUNTER
I can but she will have to come  the script in person since it's a controlled substance. Let me know if that's okay with her.

## 2023-05-02 ENCOUNTER — HOSPITAL ENCOUNTER (OUTPATIENT)
Age: 68
Discharge: HOME | End: 2023-05-04

## 2023-05-02 LAB
ANION GAP SERPL CALCULATED.3IONS-SCNC: 9 MMOL/L (ref 7–16)
BASOPHILS # BLD: 0.02 E9/L (ref 0–0.2)
BASOPHILS NFR BLD: 0.1 % (ref 0–2)
BUN SERPL-MCNC: 15 MG/DL (ref 6–23)
CALCIUM SERPL-MCNC: 8.9 MG/DL (ref 8.6–10.2)
CHLORIDE SERPL-SCNC: 102 MMOL/L (ref 98–107)
CO2 SERPL-SCNC: 25 MMOL/L (ref 22–29)
CREAT SERPL-MCNC: 0.6 MG/DL (ref 0.5–1)
EOSINOPHIL # BLD: 0 E9/L (ref 0.05–0.5)
EOSINOPHIL NFR BLD: 0 % (ref 0–6)
ERYTHROCYTE [DISTWIDTH] IN BLOOD BY AUTOMATED COUNT: 15.5 FL (ref 11.5–15)
GLUCOSE SERPL-MCNC: 140 MG/DL (ref 74–99)
HCT VFR BLD AUTO: 43.7 % (ref 34–48)
HGB BLD-MCNC: 13.6 G/DL (ref 11.5–15.5)
IMM GRANULOCYTES # BLD: 0.07 E9/L
IMM GRANULOCYTES NFR BLD: 0.5 % (ref 0–5)
LYMPHOCYTES # BLD: 0.9 E9/L (ref 1.5–4)
LYMPHOCYTES NFR BLD: 6.2 % (ref 20–42)
MCH RBC QN AUTO: 27.3 PG (ref 26–35)
MCHC RBC AUTO-ENTMCNC: 31.1 % (ref 32–34.5)
MCV RBC AUTO: 87.6 FL (ref 80–99.9)
MONOCYTES # BLD: 0.76 E9/L (ref 0.1–0.95)
MONOCYTES NFR BLD: 5.2 % (ref 2–12)
NEUTROPHILS # BLD: 12.85 E9/L (ref 1.8–7.3)
NEUTS SEG NFR BLD: 88 % (ref 43–80)
PLATELET # BLD AUTO: 260 E9/L (ref 130–450)
PMV BLD AUTO: 11.6 FL (ref 7–12)
POTASSIUM SERPL-SCNC: 4.3 MMOL/L (ref 3.5–5)
RBC # BLD AUTO: 4.99 E12/L (ref 3.5–5.5)
SODIUM SERPL-SCNC: 136 MMOL/L (ref 132–146)
WBC # BLD: 14.6 E9/L (ref 4.5–11.5)

## 2023-05-02 PROCEDURE — 85025 COMPLETE CBC W/AUTO DIFF WBC: CPT

## 2023-05-02 PROCEDURE — 80048 BASIC METABOLIC PNL TOTAL CA: CPT

## 2023-07-28 ENCOUNTER — OFFICE VISIT (OUTPATIENT)
Dept: FAMILY MEDICINE CLINIC | Age: 68
End: 2023-07-28
Payer: COMMERCIAL

## 2023-07-28 VITALS
HEIGHT: 61 IN | WEIGHT: 186 LBS | DIASTOLIC BLOOD PRESSURE: 86 MMHG | HEART RATE: 76 BPM | RESPIRATION RATE: 18 BRPM | OXYGEN SATURATION: 98 % | BODY MASS INDEX: 35.12 KG/M2 | SYSTOLIC BLOOD PRESSURE: 126 MMHG | TEMPERATURE: 97.8 F

## 2023-07-28 DIAGNOSIS — S92.534A CLOSED NONDISPLACED FRACTURE OF DISTAL PHALANX OF LESSER TOE OF RIGHT FOOT, INITIAL ENCOUNTER: ICD-10-CM

## 2023-07-28 DIAGNOSIS — T14.90XA TRAUMA: Primary | ICD-10-CM

## 2023-07-28 PROCEDURE — 1123F ACP DISCUSS/DSCN MKR DOCD: CPT | Performed by: FAMILY MEDICINE

## 2023-07-28 PROCEDURE — 99213 OFFICE O/P EST LOW 20 MIN: CPT | Performed by: FAMILY MEDICINE

## 2023-07-28 RX ORDER — CEPHALEXIN 500 MG/1
500 CAPSULE ORAL 3 TIMES DAILY
Qty: 21 CAPSULE | Refills: 0 | Status: SHIPPED | OUTPATIENT
Start: 2023-07-28 | End: 2023-08-04

## 2023-07-28 ASSESSMENT — ENCOUNTER SYMPTOMS
RESPIRATORY NEGATIVE: 1
COLOR CHANGE: 1
GASTROINTESTINAL NEGATIVE: 1

## 2023-07-28 NOTE — PROGRESS NOTES
cyclobenzaprine (FLEXERIL) 10 MG tablet, Take 10 mg by mouth 3 times daily as needed for Muscle spasms, Disp: , Rfl:     vitamin C (ASCORBIC ACID) 500 MG tablet, Take 500 mg by mouth daily, Disp: , Rfl:     omeprazole (PRILOSEC) 20 MG delayed release capsule, Take 1 capsule by mouth Daily, Disp: 90 capsule, Rfl: 1    aspirin 81 MG EC tablet, Take by mouth To verify last dose w dr, Disp: , Rfl:     vitamin B-12 (CYANOCOBALAMIN) 1000 MCG tablet, Take 1,000 mcg by mouth daily , Disp: , Rfl:     Cholecalciferol (VITAMIN D3) 1000 units CAPS, Take 2,000 Units by mouth daily , Disp: , Rfl:    Patient Active Problem List   Diagnosis    Depression    Arthritis    Hyperlipidemia    Anxiety    Fibromyalgia    Lumbar spinal stenosis    Mild intermittent asthma    Pulmonary nodules    Osteopenia    Obesity    PVC's (premature ventricular contractions)    PATRICK (obstructive sleep apnea)     Past Medical History:   Diagnosis Date    Abdominal wound dehiscence 2013    Anxiety     Arthritis     OSTEO    Depression     STABLE WITH MEDS PER PT    Difficulty swallowing     Fibromyalgia     History of colon polyps     Hyperlipidemia     diet controlled    Lumbar spinal stenosis     Mild intermittent asthma     Obesity     Osteopenia     Pulmonary nodules     Vitamin B12 deficiency     Vitamin D deficiency      Past Surgical History:   Procedure Laterality Date    ABDOMEN SURGERY  04/2013    panniculectomy with abdominoplasty    ANKLE SURGERY Bilateral     X2    BREAST REDUCTION SURGERY Bilateral     BREAST SURGERY Left 06/2013    needle lumpectomy, fibrocystic changes    CHOLECYSTECTOMY, LAPAROSCOPIC      HERNIA REPAIR      x2, Sankovic  x1, Generalovich    HYSTERECTOMY (CERVIX STATUS UNKNOWN)      LIPECTOMY      RECTAL PROLAPSE REPAIR      TONSILLECTOMY      UPPER GASTROINTESTINAL ENDOSCOPY N/A 3/15/2021    EGD ESOPHAGOGASTRODUODENOSCOPY performed by Erick Munguia MD at [address] History     Socioeconomic History

## 2023-08-01 ENCOUNTER — OFFICE VISIT (OUTPATIENT)
Dept: PODIATRY | Age: 68
End: 2023-08-01
Payer: COMMERCIAL

## 2023-08-01 VITALS
SYSTOLIC BLOOD PRESSURE: 136 MMHG | WEIGHT: 186 LBS | DIASTOLIC BLOOD PRESSURE: 74 MMHG | BODY MASS INDEX: 35.14 KG/M2 | TEMPERATURE: 97.4 F

## 2023-08-01 DIAGNOSIS — M79.675 PAIN IN LEFT TOE(S): ICD-10-CM

## 2023-08-01 DIAGNOSIS — B35.1 TINEA UNGUIUM: ICD-10-CM

## 2023-08-01 DIAGNOSIS — M79.674 PAIN IN TOE OF RIGHT FOOT: ICD-10-CM

## 2023-08-01 DIAGNOSIS — I73.9 PERIPHERAL VASCULAR DISEASE, UNSPECIFIED (HCC): ICD-10-CM

## 2023-08-01 DIAGNOSIS — S92.534A CLOSED NONDISPLACED FRACTURE OF DISTAL PHALANX OF LESSER TOE OF RIGHT FOOT, INITIAL ENCOUNTER: Primary | ICD-10-CM

## 2023-08-01 PROCEDURE — 99213 OFFICE O/P EST LOW 20 MIN: CPT | Performed by: PODIATRIST

## 2023-08-01 PROCEDURE — 1123F ACP DISCUSS/DSCN MKR DOCD: CPT | Performed by: PODIATRIST

## 2023-08-01 NOTE — PROGRESS NOTES
Sadedana Mittalabelardo  Return Patient    Chief Complaint   Patient presents with    Toe Pain     Nail care Jennifer Dillard MD  6/15/2023      Foot Injury     Fx right foot had xrays last week referred to us by Dr. Gabriella Maurice   They tapped her toes together        Subjective: This Yahaira Cobb comes to office for foot and nail care. Pt currently has complaint of thickened, painful, elongated nails that he/she cannot manage by themselves. Pt. Relates pain to nails with shoe gear. Pt's primary care physician is Jennifer Dillard MD. patient has a new issue patient 1 week ago kicked a table at home right foot very swollen went to [facility].   Past Medical History:   Diagnosis Date    Abdominal wound dehiscence 2013    Anxiety     Arthritis     OSTEO    Depression     STABLE WITH MEDS PER PT    Difficulty swallowing     Fibromyalgia     History of colon polyps     Hyperlipidemia     diet controlled    Lumbar spinal stenosis     Mild intermittent asthma     Obesity     Osteopenia     Pulmonary nodules     Vitamin B12 deficiency     Vitamin D deficiency        Allergies   Allergen Reactions    Promethazine-Phenylephrine      RESTLESS LEGS AND EMISIS        PHENARGAN     Current Outpatient Medications on File Prior to Visit   Medication Sig Dispense Refill    cephALEXin (KEFLEX) 500 MG capsule Take 1 capsule by mouth 3 times daily for 7 days 21 capsule 0    Vibegron (GEMTESA) 75 MG TABS Take by mouth      mirtazapine (REMERON) 15 MG tablet TAKE ONE TABLET BY MOUTH EVERY NIGHT AT BEDTIME      cyclobenzaprine (FLEXERIL) 10 MG tablet Take 1 tablet by mouth 3 times daily as needed for Muscle spasms      vitamin C (ASCORBIC ACID) 500 MG tablet Take 1 tablet by mouth daily      omeprazole (PRILOSEC) 20 MG delayed release capsule Take 1 capsule by mouth Daily 90 capsule 1    aspirin 81 MG EC tablet Take by mouth To verify last dose w       vitamin B-12 (CYANOCOBALAMIN) 1000 MCG tablet Take 1 tablet by mouth daily      Cholecalciferol

## 2024-02-05 ENCOUNTER — TELEPHONE (OUTPATIENT)
Dept: PODIATRY | Age: 69
End: 2024-02-05

## 2024-02-05 NOTE — TELEPHONE ENCOUNTER
Pt called to get seen as soon as possible for Left foot pain and difficulty walking. Lump on the side of the foot for appx 2 weeks. She stated today when she stepped own on it she could barely walk. No availability at this time due to pt care. Please contact pt.

## 2024-02-08 ENCOUNTER — OFFICE VISIT (OUTPATIENT)
Dept: PODIATRY | Age: 69
End: 2024-02-08
Payer: COMMERCIAL

## 2024-02-08 VITALS
SYSTOLIC BLOOD PRESSURE: 131 MMHG | TEMPERATURE: 97.7 F | DIASTOLIC BLOOD PRESSURE: 82 MMHG | BODY MASS INDEX: 32.12 KG/M2 | WEIGHT: 170 LBS

## 2024-02-08 DIAGNOSIS — M79.672 PAIN IN LEFT FOOT: Primary | ICD-10-CM

## 2024-02-08 DIAGNOSIS — S92.355A CLOSED NONDISPLACED FRACTURE OF FIFTH METATARSAL BONE OF LEFT FOOT, INITIAL ENCOUNTER: ICD-10-CM

## 2024-02-08 PROCEDURE — 1123F ACP DISCUSS/DSCN MKR DOCD: CPT | Performed by: PODIATRIST

## 2024-02-08 PROCEDURE — 99213 OFFICE O/P EST LOW 20 MIN: CPT | Performed by: PODIATRIST

## 2024-02-08 RX ORDER — AMLODIPINE BESYLATE 5 MG/1
5 TABLET ORAL DAILY
COMMUNITY
Start: 2024-01-24

## 2024-02-08 RX ORDER — ROSUVASTATIN CALCIUM 5 MG/1
5 TABLET, COATED ORAL DAILY
COMMUNITY

## 2024-02-08 NOTE — PROGRESS NOTES
diet controlled    Lumbar spinal stenosis     Mild intermittent asthma     Obesity     Osteopenia     Pulmonary nodules     Vitamin B12 deficiency     Vitamin D deficiency      Past Surgical History:   Procedure Laterality Date    ABDOMEN SURGERY  04/2013    panniculectomy with abdominoplasty    ANKLE SURGERY Bilateral     X2    BREAST REDUCTION SURGERY Bilateral     BREAST SURGERY Left 06/2013    needle lumpectomy, fibrocystic changes    CHOLECYSTECTOMY, LAPAROSCOPIC      HERNIA REPAIR      x2, Sankovic  x1, Generalovich    HYSTERECTOMY (CERVIX STATUS UNKNOWN)      LIPECTOMY      RECTAL PROLAPSE REPAIR      TONSILLECTOMY      UPPER GASTROINTESTINAL ENDOSCOPY N/A 3/15/2021    EGD ESOPHAGOGASTRODUODENOSCOPY performed by Lester David MD at [facility] END[facility]     Family History   Problem Relation Age of Onset    Breast Cancer Mother     Diabetes type 2  Father     Other Other         no siblings     Social History     Socioeconomic History    Marital status:      Spouse name: Not on file    Number of children: Not on file    Years of education: Not on file    Highest education level: Not on file   Occupational History    Occupation: retired [employer]   Tobacco Use    Smoking status: Never    Smokeless tobacco: Never   Vaping Use    Vaping Use: Never used   Substance and Sexual Activity    Alcohol use: No    Drug use: No    Sexual activity: Not on file   Other Topics Concern    Not on file   Social History Narrative    Not on file     Social Determinants of Health     Financial Resource Strain: Not on file   Food Insecurity: Not on file   Transportation Needs: Not on file   Physical Activity: Not on file   Stress: Not on file   Social Connections: Not on file   Intimate Partner Violence: Not on file   Housing Stability: Not on file       Vitals:    02/08/24 0857   BP: 131/82   Temp: 97.7 °F (36.5 °C)   TempSrc: Temporal   Weight: 77.1 kg (170 lb)       Focused Lower Extremity Physical Exam:  Vitals:

## 2024-03-11 ENCOUNTER — OFFICE VISIT (OUTPATIENT)
Dept: PODIATRY | Age: 69
End: 2024-03-11
Payer: COMMERCIAL

## 2024-03-11 VITALS — TEMPERATURE: 97.4 F | WEIGHT: 170 LBS | BODY MASS INDEX: 32.12 KG/M2

## 2024-03-11 DIAGNOSIS — S92.902D CLOSED FRACTURE OF LEFT FOOT WITH ROUTINE HEALING, SUBSEQUENT ENCOUNTER: Primary | ICD-10-CM

## 2024-03-11 PROCEDURE — 1123F ACP DISCUSS/DSCN MKR DOCD: CPT | Performed by: PODIATRIST

## 2024-03-11 PROCEDURE — 99214 OFFICE O/P EST MOD 30 MIN: CPT | Performed by: PODIATRIST

## 2024-03-11 RX ORDER — PREDNISONE 10 MG/1
TABLET ORAL
Qty: 18 TABLET | Refills: 1 | Status: SHIPPED | OUTPATIENT
Start: 2024-03-11

## 2024-03-11 RX ORDER — PREDNISONE 10 MG/1
TABLET ORAL
Qty: 18 TABLET | Refills: 1 | Status: SHIPPED
Start: 2024-03-11 | End: 2024-03-11 | Stop reason: SDUPTHER

## 2024-03-11 NOTE — PROGRESS NOTES
3/11/24  Daria Cervantes : 1955 Sex: female  Age: 68 y.o.    Patient was referred by Matilde Hinds MD    Chief Complaint   Patient presents with    Foot Injury     Left foot fx fu   In cam walker  Stating foot feels better now her left ankle is sore        SUBJECTIVE patient is seen today for follow-up of the left foot fracture she has been in a cam walker for 3 weeks feeling excellent although having some issues with the left ankle  Foot Injury       Review of Systems  Const: Denies constitutional symptoms  Musculo: Denies symptoms other than stated above  Skin: Denies symptoms other than stated above       Current Outpatient Medications:     predniSONE (DELTASONE) 10 MG tablet, 3 tabs  Qd x 3  days   2 tabs qd x 3 days  1 tab qd  X 3 days, Disp: 18 tablet, Rfl: 1    amLODIPine (NORVASC) 5 MG tablet, Take 1 tablet by mouth daily, Disp: , Rfl:     rosuvastatin (CRESTOR) 5 MG tablet, Take 1 tablet by mouth daily, Disp: , Rfl:     cyclobenzaprine (FLEXERIL) 10 MG tablet, Take 1 tablet by mouth 3 times daily as needed for Muscle spasms, Disp: , Rfl:     omeprazole (PRILOSEC) 20 MG delayed release capsule, Take 1 capsule by mouth Daily, Disp: 90 capsule, Rfl: 1    vitamin B-12 (CYANOCOBALAMIN) 1000 MCG tablet, Take 1 tablet by mouth daily, Disp: , Rfl:     Cholecalciferol (VITAMIN D3) 1000 units CAPS, Take 2 capsules by mouth daily, Disp: , Rfl:     mirtazapine (REMERON) 15 MG tablet, TAKE ONE TABLET BY MOUTH EVERY NIGHT AT BEDTIME (Patient not taking: Reported on 3/5/2024), Disp: , Rfl:     vitamin C (ASCORBIC ACID) 500 MG tablet, Take 1 tablet by mouth daily (Patient not taking: Reported on 3/5/2024), Disp: , Rfl:     aspirin 81 MG EC tablet, Take by mouth To verify last dose w  (Patient not taking: Reported on 3/5/2024), Disp: , Rfl:   Allergies   Allergen Reactions    Promethazine-Phenylephrine      RESTLESS LEGS AND EMISIS        PHENARGAN       Past Medical History:   Diagnosis Date    Abdominal wound

## 2024-04-03 ENCOUNTER — OFFICE VISIT (OUTPATIENT)
Dept: PODIATRY | Age: 69
End: 2024-04-03
Payer: COMMERCIAL

## 2024-04-03 VITALS
BODY MASS INDEX: 32.12 KG/M2 | TEMPERATURE: 98 F | SYSTOLIC BLOOD PRESSURE: 145 MMHG | WEIGHT: 170 LBS | DIASTOLIC BLOOD PRESSURE: 87 MMHG

## 2024-04-03 DIAGNOSIS — S92.902D CLOSED FRACTURE OF LEFT FOOT WITH ROUTINE HEALING, SUBSEQUENT ENCOUNTER: ICD-10-CM

## 2024-04-03 DIAGNOSIS — G89.29 CHRONIC PAIN OF LEFT ANKLE: Primary | ICD-10-CM

## 2024-04-03 DIAGNOSIS — M25.572 CHRONIC PAIN OF LEFT ANKLE: Primary | ICD-10-CM

## 2024-04-03 PROCEDURE — 1123F ACP DISCUSS/DSCN MKR DOCD: CPT | Performed by: PODIATRIST

## 2024-04-03 PROCEDURE — 99213 OFFICE O/P EST LOW 20 MIN: CPT | Performed by: PODIATRIST

## 2024-04-03 PROCEDURE — 20605 DRAIN/INJ JOINT/BURSA W/O US: CPT | Performed by: PODIATRIST

## 2024-04-03 RX ORDER — METHYLPREDNISOLONE ACETATE 40 MG/ML
40 INJECTION, SUSPENSION INTRA-ARTICULAR; INTRALESIONAL; INTRAMUSCULAR; SOFT TISSUE ONCE
Status: COMPLETED | OUTPATIENT
Start: 2024-04-03 | End: 2024-04-03

## 2024-04-03 RX ADMIN — METHYLPREDNISOLONE ACETATE 40 MG: 40 INJECTION, SUSPENSION INTRA-ARTICULAR; INTRALESIONAL; INTRAMUSCULAR; SOFT TISSUE at 11:46

## 2024-04-03 NOTE — PROGRESS NOTES
4/3/24  Daria Cervantes : 1955 Sex: female  Age: 68 y.o.    Patient was referred by Matilde Hinds MD    Chief Complaint   Patient presents with    Foot Pain    Foot Injury     Patient is here for follow up left foot fracture, x-rays were obtained today prior to her visit with us. Patient states ankle has been swollen, patient states its about the same. Matilde Hinds MD 1/10/2024       SUBJECTIVE patient is seen today for follow-up of the left foot fracture this is doing asymptomatic her left ankle is really been bothering her swelling no trauma noted.  HPI  Review of Systems  Const: Denies constitutional symptoms  Musculo: Denies symptoms other than stated above  Skin: Denies symptoms other than stated above       Current Outpatient Medications:     amLODIPine (NORVASC) 5 MG tablet, Take 1 tablet by mouth daily, Disp: , Rfl:     rosuvastatin (CRESTOR) 5 MG tablet, Take 1 tablet by mouth daily, Disp: , Rfl:     mirtazapine (REMERON) 15 MG tablet, , Disp: , Rfl:     cyclobenzaprine (FLEXERIL) 10 MG tablet, Take 1 tablet by mouth 3 times daily as needed for Muscle spasms, Disp: , Rfl:     vitamin C (ASCORBIC ACID) 500 MG tablet, Take 1 tablet by mouth daily, Disp: , Rfl:     omeprazole (PRILOSEC) 20 MG delayed release capsule, Take 1 capsule by mouth Daily, Disp: 90 capsule, Rfl: 1    aspirin 81 MG EC tablet, Take by mouth To verify last dose w , Disp: , Rfl:     vitamin B-12 (CYANOCOBALAMIN) 1000 MCG tablet, Take 1 tablet by mouth daily, Disp: , Rfl:     Cholecalciferol (VITAMIN D3) 1000 units CAPS, Take 2 capsules by mouth daily, Disp: , Rfl:     predniSONE (DELTASONE) 10 MG tablet, 3 tabs  Qd x 3  days   2 tabs qd x 3 days  1 tab qd  X 3 days, Disp: 18 tablet, Rfl: 1  Allergies   Allergen Reactions    Promethazine-Phenylephrine      RESTLESS LEGS AND EMISIS        PHENARGAN       Past Medical History:   Diagnosis Date    Abdominal wound dehiscence     Anxiety     Arthritis     OSTEO    Depression

## 2024-05-01 ENCOUNTER — OFFICE VISIT (OUTPATIENT)
Dept: PODIATRY | Age: 69
End: 2024-05-01

## 2024-05-01 VITALS
SYSTOLIC BLOOD PRESSURE: 118 MMHG | DIASTOLIC BLOOD PRESSURE: 74 MMHG | TEMPERATURE: 98.2 F | BODY MASS INDEX: 32.12 KG/M2 | WEIGHT: 170 LBS

## 2024-05-01 DIAGNOSIS — G89.29 CHRONIC PAIN OF LEFT ANKLE: Primary | ICD-10-CM

## 2024-05-01 DIAGNOSIS — M25.572 CHRONIC PAIN OF LEFT ANKLE: Primary | ICD-10-CM

## 2024-05-17 ENCOUNTER — OFFICE VISIT (OUTPATIENT)
Dept: FAMILY MEDICINE CLINIC | Age: 69
End: 2024-05-17
Payer: COMMERCIAL

## 2024-05-17 VITALS
OXYGEN SATURATION: 96 % | TEMPERATURE: 97.9 F | BODY MASS INDEX: 33.29 KG/M2 | HEART RATE: 83 BPM | WEIGHT: 176.2 LBS | DIASTOLIC BLOOD PRESSURE: 82 MMHG | SYSTOLIC BLOOD PRESSURE: 136 MMHG

## 2024-05-17 DIAGNOSIS — S92.315A NONDISPLACED FRACTURE OF FIRST METATARSAL BONE, LEFT FOOT, INITIAL ENCOUNTER FOR CLOSED FRACTURE: Primary | ICD-10-CM

## 2024-05-17 DIAGNOSIS — M79.672 LEFT FOOT PAIN: ICD-10-CM

## 2024-05-17 PROCEDURE — 99214 OFFICE O/P EST MOD 30 MIN: CPT | Performed by: PHYSICIAN ASSISTANT

## 2024-05-17 PROCEDURE — 1123F ACP DISCUSS/DSCN MKR DOCD: CPT | Performed by: PHYSICIAN ASSISTANT

## 2024-05-17 NOTE — PROGRESS NOTES
or concerns at this time the patient will be discharged home.      Clinical Impression:   Daria was seen today for foot injury.    Diagnoses and all orders for this visit:    Nondisplaced fracture of first metatarsal bone, left foot, initial encounter for closed fracture    Left foot pain  -     XR FOOT LEFT (MIN 3 VIEWS); Future        The patient is to call for any concerns or return if any of the signs or symptoms worsen. The patient is to follow-up with PCP in the next 2-3 days for repeat evaluation repeat assessment or go directly to the emergency department.     SIGNATURE: Timoteo Joshi III, PA-C

## 2024-06-05 ENCOUNTER — OFFICE VISIT (OUTPATIENT)
Dept: ORTHOPEDIC SURGERY | Age: 69
End: 2024-06-05
Payer: COMMERCIAL

## 2024-06-05 VITALS — WEIGHT: 170 LBS | BODY MASS INDEX: 32.1 KG/M2 | HEIGHT: 61 IN

## 2024-06-05 DIAGNOSIS — M25.461 EFFUSION OF RIGHT KNEE JOINT: Primary | ICD-10-CM

## 2024-06-05 DIAGNOSIS — Z96.651 TOTAL KNEE REPLACEMENT STATUS, RIGHT: ICD-10-CM

## 2024-06-05 DIAGNOSIS — M25.561 ACUTE PAIN OF RIGHT KNEE: ICD-10-CM

## 2024-06-05 DIAGNOSIS — M25.461 EFFUSION OF RIGHT KNEE JOINT: ICD-10-CM

## 2024-06-05 LAB
C-REACTIVE PROTEIN: 144 MG/L (ref 0–5)
SED RATE, AUTOMATED: 35 MM/HR (ref 0–20)

## 2024-06-05 PROCEDURE — 1123F ACP DISCUSS/DSCN MKR DOCD: CPT | Performed by: PHYSICIAN ASSISTANT

## 2024-06-05 PROCEDURE — 99203 OFFICE O/P NEW LOW 30 MIN: CPT | Performed by: PHYSICIAN ASSISTANT

## 2024-06-05 NOTE — PROGRESS NOTES
unlabored, no audible wheezes note  Cardiac: distal pulses palpable, skin well perfused  Neurological: alert, oriented X3, normal speech, no focal findings or movement disorder noted, motor and sensory grossly normal bilaterally, normal muscle tone  HEENT: normochephalic atraumatic, external ears and eyes normal, sclera normal, neck supple  Extremities:   peripheral pulses normal, no edema, redness or tenderness in the calves   Skin: normal coloration, no rashes or open wounds, no suspicious skin lesions noted  Psych: Affect euthymic   Musculoskeletal:   Knee: On visual inspection there is no obvious deformity to the right knee, no erythema, ecchymosis, open wounds.  Patient does have mild to moderate edema localized to the right knee.  This area is also warm to touch as compared to the contralateral side.  There is no erythema, open wounds or drainage.  There is no decreased sensation to light touch throughout the entire lower extremity.  Patient is grossly neurovascularly intact.    Right Knee: Patient is tender to Palpation medial and lateral joint line as well as superior lateral patella, not tender to palpation elsewhere throughout the knee.  Patient has painful limited range of motion of the knee with normal patellar tracking  (-) Ballotable patella, (-) Hugo      Ht 1.549 m (5' 1\") Comment: per patient  Wt 77.1 kg (170 lb) Comment: per patient  BMI 32.12 kg/m²      XR: 4 view right knee x-rays were obtained in the clinic today which were negative for acute findings of fracture or dislocation.  She does have a well-positioned total knee implant without evidence of loosening or wear.    The imaging will be reviewed and interpreted by Radiologist.  The report was not complete at the time of this note.  Please refer to Radiologist report for their interpretation.           ASSESSMENT:   Diagnosis Orders   1. Acute pain of right knee  XR KNEE RIGHT (MIN 4 VIEWS)          PLAN:  Pt is a new pt to me who presents

## 2024-06-06 ENCOUNTER — TELEPHONE (OUTPATIENT)
Dept: ORTHOPEDIC SURGERY | Age: 69
End: 2024-06-06

## 2024-06-06 DIAGNOSIS — Z96.651 TOTAL KNEE REPLACEMENT STATUS, RIGHT: Primary | ICD-10-CM

## 2024-06-06 DIAGNOSIS — M25.461 EFFUSION OF RIGHT KNEE JOINT: ICD-10-CM

## 2024-06-06 RX ORDER — CEPHALEXIN 500 MG/1
500 CAPSULE ORAL 4 TIMES DAILY
Qty: 28 CAPSULE | Refills: 0 | Status: SHIPPED | OUTPATIENT
Start: 2024-06-06 | End: 2024-06-13

## 2024-06-06 NOTE — TELEPHONE ENCOUNTER
Called patient this morning to notify her that her inflammatory markers were elevated.  CRP is 144 and sed rate is 35.  I did explain to the patient that these are generalized inflammatory markers and can be elevated for a variety of reasons.  However, given the elevation of the labs and the fact that she has new onset knee pain and swelling with a total knee prosthesis and with no mechanism of injury; I do think we should start her on antibiotics.  I am going to start Keflex 500 mg 1 tablet every 6 hours for 7 days dispense 28 with 0 refills.  If her symptoms worsen, she develops erythema, increase in swelling or pain, warmth or if she would develop systemic symptoms of infection such as fever, chills, general malaise she should proceed to local emergency department.    Patient states she did reach out to Dr. Moon office yesterday to notify them of her symptoms and that she had seen Ortho walk-in and was given an appointment later this month.  She asked that I reach out to his office to update them with this lab work and plan.    I did contact Dr. Moon office and spoke with staff.  I explained elevation of labs, new onset knee pain, swelling.  I explained I did have concerns and plan to start her on antibiotic therapy.  However, I wanted to touch base with Dr. Moon because if he was planning to see her sooner to do any type of aspiration or cultures I would have her hold off on starting antibiotics so this did not skew results.  I was told to just fax the results to their office for Dr. Moon or his PA to review and go ahead and start antibiotics.    We did fax all results to Dr. Moon office for further review.  I advised the patient to proceed with starting antibiotic therapy as discussed.

## 2024-06-24 ENCOUNTER — OFFICE VISIT (OUTPATIENT)
Dept: PODIATRY | Age: 69
End: 2024-06-24
Payer: COMMERCIAL

## 2024-06-24 VITALS — BODY MASS INDEX: 32.12 KG/M2 | TEMPERATURE: 97.8 F | WEIGHT: 170 LBS

## 2024-06-24 DIAGNOSIS — M25.572 CHRONIC PAIN OF LEFT ANKLE: ICD-10-CM

## 2024-06-24 DIAGNOSIS — S92.902D CLOSED FRACTURE OF LEFT FOOT WITH ROUTINE HEALING, SUBSEQUENT ENCOUNTER: Primary | ICD-10-CM

## 2024-06-24 DIAGNOSIS — G89.29 CHRONIC PAIN OF LEFT ANKLE: ICD-10-CM

## 2024-06-24 PROCEDURE — 99213 OFFICE O/P EST LOW 20 MIN: CPT | Performed by: PODIATRIST

## 2024-06-24 PROCEDURE — 1123F ACP DISCUSS/DSCN MKR DOCD: CPT | Performed by: PODIATRIST

## 2024-06-25 NOTE — PROGRESS NOTES
Daria Cervantes : 1955 Sex: female  Age: 68 y.o.    Patient was referred by Matilde Hinds MD    Chief Complaint   Patient presents with    Foot Injury     Patient is here for follow up left foot fracture   [facility] PCP Matilde Holden MD 1/10/2024  Went thru [facility] for knee pain in cam walker sent for new xrays     Toe Pain     Nail care        SUBJECTIVE patient is seen today for follow-up of the left foot fracture this is doing asymptomatic her left ankle is really been bothering her swelling no trauma noted.  Foot Injury     Toe Pain       Review of Systems  Const: Denies constitutional symptoms  Musculo: Denies symptoms other than stated above  Skin: Denies symptoms other than stated above       Current Outpatient Medications:     amLODIPine (NORVASC) 5 MG tablet, Take 1 tablet by mouth daily, Disp: , Rfl:     rosuvastatin (CRESTOR) 5 MG tablet, Take 1 tablet by mouth daily, Disp: , Rfl:     mirtazapine (REMERON) 15 MG tablet, , Disp: , Rfl:     cyclobenzaprine (FLEXERIL) 10 MG tablet, Take 1 tablet by mouth 3 times daily as needed for Muscle spasms, Disp: , Rfl:     vitamin C (ASCORBIC ACID) 500 MG tablet, Take 1 tablet by mouth daily, Disp: , Rfl:     omeprazole (PRILOSEC) 20 MG delayed release capsule, Take 1 capsule by mouth Daily, Disp: 90 capsule, Rfl: 1    aspirin 81 MG EC tablet, Take by mouth To verify last dose w , Disp: , Rfl:     vitamin B-12 (CYANOCOBALAMIN) 1000 MCG tablet, Take 1 tablet by mouth daily, Disp: , Rfl:     Cholecalciferol (VITAMIN D3) 1000 units CAPS, Take 2 capsules by mouth daily, Disp: , Rfl:   Allergies   Allergen Reactions    Promethazine-Phenylephrine      RESTLESS LEGS AND EMISIS        PHENARGAN       Past Medical History:   Diagnosis Date    Abdominal wound dehiscence     Anxiety     Arthritis     OSTEO    Depression     STABLE WITH MEDS PER PT    Difficulty swallowing     Fibromyalgia     History of colon polyps     Hyperlipidemia     diet controlled

## 2024-08-19 ENCOUNTER — OFFICE VISIT (OUTPATIENT)
Dept: FAMILY MEDICINE CLINIC | Age: 69
End: 2024-08-19
Payer: COMMERCIAL

## 2024-08-19 VITALS
SYSTOLIC BLOOD PRESSURE: 128 MMHG | BODY MASS INDEX: 32.28 KG/M2 | WEIGHT: 171 LBS | RESPIRATION RATE: 18 BRPM | OXYGEN SATURATION: 98 % | HEART RATE: 76 BPM | HEIGHT: 61 IN | DIASTOLIC BLOOD PRESSURE: 84 MMHG | TEMPERATURE: 98 F

## 2024-08-19 DIAGNOSIS — M79.671 RIGHT FOOT PAIN: Primary | ICD-10-CM

## 2024-08-19 PROCEDURE — 1123F ACP DISCUSS/DSCN MKR DOCD: CPT | Performed by: STUDENT IN AN ORGANIZED HEALTH CARE EDUCATION/TRAINING PROGRAM

## 2024-08-19 PROCEDURE — 99213 OFFICE O/P EST LOW 20 MIN: CPT | Performed by: STUDENT IN AN ORGANIZED HEALTH CARE EDUCATION/TRAINING PROGRAM

## 2024-08-19 SDOH — ECONOMIC STABILITY: FOOD INSECURITY: WITHIN THE PAST 12 MONTHS, THE FOOD YOU BOUGHT JUST DIDN'T LAST AND YOU DIDN'T HAVE MONEY TO GET MORE.: NEVER TRUE

## 2024-08-19 SDOH — ECONOMIC STABILITY: INCOME INSECURITY: HOW HARD IS IT FOR YOU TO PAY FOR THE VERY BASICS LIKE FOOD, HOUSING, MEDICAL CARE, AND HEATING?: NOT HARD AT ALL

## 2024-08-19 SDOH — ECONOMIC STABILITY: FOOD INSECURITY: WITHIN THE PAST 12 MONTHS, YOU WORRIED THAT YOUR FOOD WOULD RUN OUT BEFORE YOU GOT MONEY TO BUY MORE.: NEVER TRUE

## 2024-08-19 ASSESSMENT — PATIENT HEALTH QUESTIONNAIRE - PHQ9
8. MOVING OR SPEAKING SO SLOWLY THAT OTHER PEOPLE COULD HAVE NOTICED. OR THE OPPOSITE, BEING SO FIGETY OR RESTLESS THAT YOU HAVE BEEN MOVING AROUND A LOT MORE THAN USUAL: NOT AT ALL
1. LITTLE INTEREST OR PLEASURE IN DOING THINGS: NOT AT ALL
SUM OF ALL RESPONSES TO PHQ9 QUESTIONS 1 & 2: 0
SUM OF ALL RESPONSES TO PHQ QUESTIONS 1-9: 0
10. IF YOU CHECKED OFF ANY PROBLEMS, HOW DIFFICULT HAVE THESE PROBLEMS MADE IT FOR YOU TO DO YOUR WORK, TAKE CARE OF THINGS AT HOME, OR GET ALONG WITH OTHER PEOPLE: NOT DIFFICULT AT ALL
6. FEELING BAD ABOUT YOURSELF - OR THAT YOU ARE A FAILURE OR HAVE LET YOURSELF OR YOUR FAMILY DOWN: NOT AT ALL
7. TROUBLE CONCENTRATING ON THINGS, SUCH AS READING THE NEWSPAPER OR WATCHING TELEVISION: NOT AT ALL
2. FEELING DOWN, DEPRESSED OR HOPELESS: NOT AT ALL
4. FEELING TIRED OR HAVING LITTLE ENERGY: NOT AT ALL
SUM OF ALL RESPONSES TO PHQ QUESTIONS 1-9: 0
SUM OF ALL RESPONSES TO PHQ QUESTIONS 1-9: 0
3. TROUBLE FALLING OR STAYING ASLEEP: NOT AT ALL
9. THOUGHTS THAT YOU WOULD BE BETTER OFF DEAD, OR OF HURTING YOURSELF: NOT AT ALL
SUM OF ALL RESPONSES TO PHQ QUESTIONS 1-9: 0
5. POOR APPETITE OR OVEREATING: NOT AT ALL

## 2024-08-19 ASSESSMENT — ENCOUNTER SYMPTOMS
SHORTNESS OF BREATH: 0
RHINORRHEA: 0
NAUSEA: 0
VOMITING: 0
ABDOMINAL PAIN: 0
COUGH: 0

## 2024-08-19 NOTE — PROGRESS NOTES
Daria Cervantes (:  1955) is a 69 y.o. female,Established patient, here for evaluation of the following chief complaint(s):  Foot Pain (R foot)         Assessment & Plan  Right foot pain       Orders:    XR FOOT RIGHT (MIN 3 VIEWS); Future    XR. Start wearing boot she has on hand. Will alert podiatry of x ray. Keep follow up with podiatry. She reports tylenol has been adequate for pain control.  No follow-ups on file.       Subjective   R foot pain  -concerned for fracture  -hx of multiple procedures on that side + osteoporosis  -hurts on the lateral side  -has not taken any medication at home        Review of Systems   Constitutional:  Negative for chills and fever.   HENT:  Negative for congestion and rhinorrhea.    Respiratory:  Negative for cough and shortness of breath.    Cardiovascular:  Negative for chest pain and leg swelling.   Gastrointestinal:  Negative for abdominal pain, nausea and vomiting.   Genitourinary:  Negative for dysuria and hematuria.   Musculoskeletal:  Positive for arthralgias and gait problem. Negative for myalgias.   Skin:  Negative for rash and wound.   Neurological:  Negative for dizziness and light-headedness.          Objective   Physical Exam  Vitals reviewed.   Constitutional:       General: She is not in acute distress.  HENT:      Head: Normocephalic and atraumatic.   Eyes:      Extraocular Movements: Extraocular movements intact.      Conjunctiva/sclera: Conjunctivae normal.   Cardiovascular:      Rate and Rhythm: Normal rate and regular rhythm.      Pulses:           Dorsalis pedis pulses are 2+ on the right side and 2+ on the left side.        Posterior tibial pulses are 2+ on the right side and 2+ on the left side.   Pulmonary:      Effort: Pulmonary effort is normal. No respiratory distress.   Musculoskeletal:         General: No tenderness or deformity.      Left foot: Normal range of motion.        Feet:    Feet:      Left foot:      Skin integrity: No ulcer,

## 2024-08-26 ENCOUNTER — OFFICE VISIT (OUTPATIENT)
Dept: PODIATRY | Age: 69
End: 2024-08-26
Payer: COMMERCIAL

## 2024-08-26 VITALS
TEMPERATURE: 98.2 F | WEIGHT: 171 LBS | BODY MASS INDEX: 32.31 KG/M2 | SYSTOLIC BLOOD PRESSURE: 142 MMHG | DIASTOLIC BLOOD PRESSURE: 78 MMHG

## 2024-08-26 DIAGNOSIS — S86.311A PERONEAL TENDON RUPTURE, RIGHT, INITIAL ENCOUNTER: Primary | ICD-10-CM

## 2024-08-26 PROCEDURE — 99213 OFFICE O/P EST LOW 20 MIN: CPT | Performed by: PODIATRIST

## 2024-08-26 PROCEDURE — 1123F ACP DISCUSS/DSCN MKR DOCD: CPT | Performed by: PODIATRIST

## 2024-08-26 NOTE — PROGRESS NOTES
24  Daria Cervantes : 1955 Sex: female  Age: 69 y.o.    Patient was referred by Matilde Hinds MD    Chief Complaint   Patient presents with    Foot Pain     F/u right foot pain  Pt [facility] thru [facility]  did xrays no fx noted had an old cam walker gave her a sore on her ankle so she stopped wearing it   She thinks she fell in a hole   Matilde Hinds MD  Last visit 24       SUBJECTIVE patient is seen today for a new issue approximately 9 days ago she fell in a hole twisting her right foot she has been in a cam walker but it still very sore  Foot Pain       Review of Systems  Const: Denies constitutional symptoms  Musculo: Denies symptoms other than stated above  Skin: Denies symptoms other than stated above       Current Outpatient Medications:     amLODIPine (NORVASC) 5 MG tablet, Take 1 tablet by mouth daily, Disp: , Rfl:     rosuvastatin (CRESTOR) 5 MG tablet, Take 1 tablet by mouth daily, Disp: , Rfl:     mirtazapine (REMERON) 15 MG tablet, , Disp: , Rfl:     cyclobenzaprine (FLEXERIL) 10 MG tablet, Take 1 tablet by mouth 3 times daily as needed for Muscle spasms, Disp: , Rfl:     vitamin C (ASCORBIC ACID) 500 MG tablet, Take 1 tablet by mouth daily, Disp: , Rfl:     omeprazole (PRILOSEC) 20 MG delayed release capsule, Take 1 capsule by mouth Daily, Disp: 90 capsule, Rfl: 1    aspirin 81 MG EC tablet, Take by mouth To verify last dose w , Disp: , Rfl:     vitamin B-12 (CYANOCOBALAMIN) 1000 MCG tablet, Take 1 tablet by mouth daily, Disp: , Rfl:     Cholecalciferol (VITAMIN D3) 1000 units CAPS, Take 2 capsules by mouth daily, Disp: , Rfl:   Allergies   Allergen Reactions    Promethazine-Phenylephrine      RESTLESS LEGS AND EMISIS        PHENARGAN       Past Medical History:   Diagnosis Date    Abdominal wound dehiscence     Anxiety     Arthritis     OSTEO    Depression     STABLE WITH MEDS PER PT    Difficulty swallowing     Fibromyalgia     History of colon polyps     Hyperlipidemia  Transportation (Medical): Not on file     Lack of Transportation (Non-Medical): No   Physical Activity: Not on file   Stress: Not on file   Social Connections: Not on file   Intimate Partner Violence: Not on file   Housing Stability: Unknown (8/19/2024)    Housing Stability Vital Sign     Unable to Pay for Housing in the Last Year: Not on file     Number of Times Moved in the Last Year: Not on file     Homeless in the Last Year: No       Vitals:    08/26/24 1603   BP: (!) 142/78   Temp: 98.2 °F (36.8 °C)   TempSrc: Temporal   Weight: 77.6 kg (171 lb)       Focused Lower Extremity Physical Exam:  Vitals:    08/26/24 1603   BP: (!) 142/78   Temp: 98.2 °F (36.8 °C)        Foot Exam    General  General Appearance: appears stated age and healthy   Orientation: alert and oriented to person, place, and time       Right Foot/Ankle     Inspection and Palpation  Tenderness: (Peroneal tendon)  Swelling: dorsum   Skin Exam: skin intact;     Neurovascular  Dorsalis pedis: 3+  Posterior tibial: 3+  Saphenous nerve sensation: normal  Tibial nerve sensation: normal  Superficial peroneal nerve sensation: normal  Deep peroneal nerve sensation: normal  Sural nerve sensation: normal  Achilles reflex: 2+  Babinski reflex: 2+    Muscle Strength  Ankle dorsiflexion: 5  Ankle plantar flexion: 5  Ankle inversion: 1  Ankle eversion: 5  Great toe extension: 5  Great toe flexion: 5    Range of Motion    Passive  Ankle dorsiflexion: pain  Ankle plantar flexion: pain  Ankle eversion: pain  Ankle inversion: pain               Right Ankle Exam     Muscle Strength   Dorsiflexion:  5/5  Plantar flexion:  5/5             Vascular: pulses  dp  pt palpable  Capillary Refill Time:   Hair growth  Skin:    Edema:    Neurologic:      Musculoskeletal/ Orthopedic examination: Examination of the right ankle reveals tenderness along the peroneal tendon inferior aspect of the lateral malleolus old x-rays were reviewed showing no fractures.        Assessment and

## 2024-09-11 ENCOUNTER — HOSPITAL ENCOUNTER (OUTPATIENT)
Dept: MRI IMAGING | Age: 69
Discharge: HOME | End: 2024-09-13
Attending: PODIATRIST
Payer: COMMERCIAL

## 2024-09-11 DIAGNOSIS — S86.311A PERONEAL TENDON RUPTURE, RIGHT, INITIAL ENCOUNTER: ICD-10-CM

## 2024-09-11 PROCEDURE — 73721 MRI JNT OF LWR EXTRE W/O DYE: CPT

## 2024-09-17 ENCOUNTER — OFFICE VISIT (OUTPATIENT)
Dept: PODIATRY | Age: 69
End: 2024-09-17
Payer: COMMERCIAL

## 2024-09-17 VITALS
TEMPERATURE: 97.4 F | WEIGHT: 170 LBS | BODY MASS INDEX: 32.12 KG/M2 | DIASTOLIC BLOOD PRESSURE: 80 MMHG | SYSTOLIC BLOOD PRESSURE: 130 MMHG

## 2024-09-17 DIAGNOSIS — M72.2 BILATERAL PLANTAR FASCIITIS: ICD-10-CM

## 2024-09-17 DIAGNOSIS — G89.29 CHRONIC PAIN OF LEFT ANKLE: ICD-10-CM

## 2024-09-17 DIAGNOSIS — S86.311A PERONEAL TENDON RUPTURE, RIGHT, INITIAL ENCOUNTER: Primary | ICD-10-CM

## 2024-09-17 DIAGNOSIS — M25.572 CHRONIC PAIN OF LEFT ANKLE: ICD-10-CM

## 2024-09-17 PROCEDURE — 1123F ACP DISCUSS/DSCN MKR DOCD: CPT | Performed by: PODIATRIST

## 2024-09-17 PROCEDURE — 99212 OFFICE O/P EST SF 10 MIN: CPT | Performed by: PODIATRIST

## 2024-10-01 ENCOUNTER — OFFICE VISIT (OUTPATIENT)
Dept: PODIATRY | Age: 69
End: 2024-10-01
Payer: COMMERCIAL

## 2024-10-01 VITALS
BODY MASS INDEX: 32.12 KG/M2 | TEMPERATURE: 97.4 F | SYSTOLIC BLOOD PRESSURE: 132 MMHG | DIASTOLIC BLOOD PRESSURE: 74 MMHG | WEIGHT: 170 LBS

## 2024-10-01 DIAGNOSIS — S86.311A PERONEAL TENDON RUPTURE, RIGHT, INITIAL ENCOUNTER: Primary | ICD-10-CM

## 2024-10-01 DIAGNOSIS — M79.674 PAIN IN RIGHT TOE(S): ICD-10-CM

## 2024-10-01 PROCEDURE — 99212 OFFICE O/P EST SF 10 MIN: CPT | Performed by: PODIATRIST

## 2024-10-01 PROCEDURE — 1123F ACP DISCUSS/DSCN MKR DOCD: CPT | Performed by: PODIATRIST

## 2024-10-01 NOTE — PROGRESS NOTES
10/1/24  Daria Cervantes : 1955 Sex: female  Age: 69 y.o.    Patient was referred by Matilde Hinds MD    Chief Complaint   Patient presents with    Foot Pain     TODAY WE ARE GOING OVER MRI RESULTS RIGHT ANKLE IN TALL CAM WALKER     Toe Pain     [facility] Matilde Hinds MD  24       SUBJECTIVE patient is seen today for follow-up of a torn peroneal tendon right we have gone over the MRI she has been in a cam walker for about 6 weeks with no improvement would like to discuss further treatment  Foot Pain     Toe Pain       Review of Systems  Const: Denies constitutional symptoms  Musculo: Denies symptoms other than stated above  Skin: Denies symptoms other than stated above       Current Outpatient Medications:     amLODIPine (NORVASC) 5 MG tablet, Take 1 tablet by mouth daily, Disp: , Rfl:     rosuvastatin (CRESTOR) 5 MG tablet, Take 1 tablet by mouth daily, Disp: , Rfl:     mirtazapine (REMERON) 15 MG tablet, , Disp: , Rfl:     cyclobenzaprine (FLEXERIL) 10 MG tablet, Take 1 tablet by mouth 3 times daily as needed for Muscle spasms, Disp: , Rfl:     vitamin C (ASCORBIC ACID) 500 MG tablet, Take 1 tablet by mouth daily, Disp: , Rfl:     omeprazole (PRILOSEC) 20 MG delayed release capsule, Take 1 capsule by mouth Daily, Disp: 90 capsule, Rfl: 1    aspirin 81 MG EC tablet, Take by mouth To verify last dose w , Disp: , Rfl:     vitamin B-12 (CYANOCOBALAMIN) 1000 MCG tablet, Take 1 tablet by mouth daily, Disp: , Rfl:     Cholecalciferol (VITAMIN D3) 1000 units CAPS, Take 2 capsules by mouth daily, Disp: , Rfl:   Allergies   Allergen Reactions    Promethazine-Phenylephrine      RESTLESS LEGS AND EMISIS        PHENARGAN       Past Medical History:   Diagnosis Date    Abdominal wound dehiscence     Anxiety     Arthritis     OSTEO    Depression     STABLE WITH MEDS PER PT    Difficulty swallowing     Fibromyalgia     History of colon polyps     Hyperlipidemia     diet controlled    Lumbar spinal stenosis

## 2024-10-01 NOTE — PATIENT INSTRUCTIONS
Surgery will be scheduled for 11/8/2024 at [facility] in [address] [address]  They will contact you to give you all your preop instructions  Do not eat or drink anything after midnight on 11/7/2024 nothing morning of surgery  Call Francy if any issues [phone]  Call and set up preop clearance appointment with PCP Dr. Hinds anytime after 10/9/2024  No special paperwork needs filled out she needs to see you and clear you in office note  Dr. Hinds will need to cosign note if PA clears u  Will need office note clearing you and co signed   EKG  Labs all faxed to Francy at [phone]

## 2024-10-04 ENCOUNTER — TELEPHONE (OUTPATIENT)
Dept: PODIATRY | Age: 69
End: 2024-10-04

## 2024-10-04 PROBLEM — S86.319A PERONEAL TENDON RUPTURE: Status: ACTIVE | Noted: 2024-10-04

## 2024-10-04 NOTE — TELEPHONE ENCOUNTER
Prior Authorization Form:      DEMOGRAPHICS:                     Patient Name:  Sully Larkin  Patient :  1955            Insurance:  Payor: [employer] [place] / Plan: [employer] [place] LOCAL / Product Type: *No Product type* /   Insurance ID Number:    Payer/Plan Subscr  Sex Relation Sub. Ins. ID Effective Group Num   1. [employer] [place]* SULLY LARKIN 1955 Female Self [id number]* [id number]                                    [id number]         DIAGNOSIS & PROCEDURE:                       Procedure/Operation: repair of peroneal tendon right foot           CPT Code: 28344    Diagnosis:  ruptured peroneal tendon right foot    ICD10 Code: ruptured peroneal tendon right foot    Location:  right foot    Surgeon:  rose    SCHEDULING INFORMATION:                          Date: 24    Time:               Anesthesia: general                                                        Status:  Outpatient        Special Comments:         Electronically signed by Francy Spann LPN on 10/4/2024 at 9:52 AM

## 2024-11-05 RX ORDER — ONDANSETRON 4 MG/1
4 TABLET, ORALLY DISINTEGRATING ORAL EVERY 8 HOURS PRN
COMMUNITY

## 2024-11-05 RX ORDER — LANOLIN ALCOHOL/MO/W.PET/CERES
6 CREAM (GRAM) TOPICAL NIGHTLY
COMMUNITY

## 2024-11-05 NOTE — PROGRESS NOTES
[facility] PRE-ADMISSION TESTING INSTRUCTIONS    The Preadmission Testing patient is instructed accordingly using the following criteria (check applicable):    ARRIVAL INSTRUCTIONS:  [x] Parking the day of Surgery is located in the Main Entrance lot.  Upon entering the door, make an immediate right to the surgery reception desk    [x] Bring photo ID and insurance card    [x] Bring in a copy of Living will or Durable Power of  papers.    [x] Please be sure to arrange for a responsible adult to provide transportation to and from the hospital    [x] Please arrange for a responsible adult to be with you for the 24 hour period post procedure due to having anesthesia    [x] If you awake am of surgery not feeling well or have temperature >100 please call [phone]    GENERAL INSTRUCTIONS:    [x] No solid foods after midnight, no gum, candy or mints.  May have clear liquids until 4 hours prior to surgery.         [x] You may brush your teeth, do not swallow any toothpaste    [x] Take medications as instructed     [x] Stop herbal supplements and vitamins 5 days prior to procedure    [x] Shower or bath with soap, lather and rinse well, AM of Surgery, no lotion, powders or creams to surgical site    [x] No tobacco products within 24 hours of surgery     [x] No alcohol or illegal drug use, marijuana included, within 24 hours of surgery.    [x] Jewelry, body piercing's, eyeglasses, contact lenses and dentures are not permitted into surgery (bring cases)      [x] Please do not wear any nail polish, make up or hair products on the day of surgery    [x] You can expect a call the business day prior to procedure to notify you if your arrival time changes    [x] If you receive a survey after surgery we would greatly appreciate your comments    [x] Please notify surgeon if you develop any illness between now and time of surgery (cold, cough, sore throat, fever, nausea, vomiting) or any signs of

## 2024-11-06 ENCOUNTER — PREP FOR PROCEDURE (OUTPATIENT)
Dept: PODIATRY | Age: 69
End: 2024-11-06

## 2024-11-06 RX ORDER — SODIUM CHLORIDE 0.9 % (FLUSH) 0.9 %
5-40 SYRINGE (ML) INJECTION PRN
Status: CANCELLED | OUTPATIENT
Start: 2024-11-06

## 2024-11-06 RX ORDER — SODIUM CHLORIDE 9 MG/ML
INJECTION, SOLUTION INTRAVENOUS PRN
Status: CANCELLED | OUTPATIENT
Start: 2024-11-06

## 2024-11-06 RX ORDER — SODIUM CHLORIDE 0.9 % (FLUSH) 0.9 %
5-40 SYRINGE (ML) INJECTION EVERY 12 HOURS SCHEDULED
Status: CANCELLED | OUTPATIENT
Start: 2024-11-06

## 2024-11-08 ENCOUNTER — ANESTHESIA EVENT (OUTPATIENT)
Dept: OPERATING ROOM | Age: 69
End: 2024-11-08
Payer: MEDICARE

## 2024-11-08 ENCOUNTER — HOSPITAL ENCOUNTER (OUTPATIENT)
Age: 69
Setting detail: OUTPATIENT SURGERY
Discharge: HOME | End: 2024-11-08
Attending: PODIATRIST | Admitting: PODIATRIST
Payer: MEDICARE

## 2024-11-08 ENCOUNTER — ANESTHESIA (OUTPATIENT)
Dept: OPERATING ROOM | Age: 69
End: 2024-11-08
Payer: MEDICARE

## 2024-11-08 VITALS
BODY MASS INDEX: 31.15 KG/M2 | WEIGHT: 165 LBS | HEIGHT: 61 IN | OXYGEN SATURATION: 97 % | TEMPERATURE: 97.4 F | HEART RATE: 78 BPM | RESPIRATION RATE: 18 BRPM | SYSTOLIC BLOOD PRESSURE: 171 MMHG | DIASTOLIC BLOOD PRESSURE: 74 MMHG

## 2024-11-08 DIAGNOSIS — G89.18 POST-OPERATIVE PAIN: Primary | ICD-10-CM

## 2024-11-08 DIAGNOSIS — S86.311A PERONEAL TENDON RUPTURE, RIGHT, INITIAL ENCOUNTER: ICD-10-CM

## 2024-11-08 PROBLEM — G57.81: Status: ACTIVE | Noted: 2024-11-08

## 2024-11-08 PROCEDURE — 7100000001 HC PACU RECOVERY - ADDTL 15 MIN: Performed by: PODIATRIST

## 2024-11-08 PROCEDURE — 3600000013 HC SURGERY LEVEL 3 ADDTL 15MIN: Performed by: PODIATRIST

## 2024-11-08 PROCEDURE — 3600000003 HC SURGERY LEVEL 3 BASE: Performed by: PODIATRIST

## 2024-11-08 PROCEDURE — 2709999900 HC NON-CHARGEABLE SUPPLY: Performed by: PODIATRIST

## 2024-11-08 PROCEDURE — 7100000000 HC PACU RECOVERY - FIRST 15 MIN: Performed by: PODIATRIST

## 2024-11-08 PROCEDURE — 3700000001 HC ADD 15 MINUTES (ANESTHESIA): Performed by: PODIATRIST

## 2024-11-08 PROCEDURE — 64704 REVISE HAND/FOOT NERVE: CPT | Performed by: PODIATRIST

## 2024-11-08 PROCEDURE — 7100000010 HC PHASE II RECOVERY - FIRST 15 MIN: Performed by: PODIATRIST

## 2024-11-08 PROCEDURE — 2580000003 HC RX 258: Performed by: PODIATRIST

## 2024-11-08 PROCEDURE — 7100000011 HC PHASE II RECOVERY - ADDTL 15 MIN: Performed by: PODIATRIST

## 2024-11-08 PROCEDURE — 6360000002 HC RX W HCPCS: Performed by: PODIATRIST

## 2024-11-08 PROCEDURE — C1762 CONN TISS, HUMAN(INC FASCIA): HCPCS | Performed by: PODIATRIST

## 2024-11-08 PROCEDURE — 27659 REPAIR OF LEG TENDON EACH: CPT | Performed by: PODIATRIST

## 2024-11-08 PROCEDURE — 2500000003 HC RX 250 WO HCPCS

## 2024-11-08 PROCEDURE — 3700000000 HC ANESTHESIA ATTENDED CARE: Performed by: PODIATRIST

## 2024-11-08 PROCEDURE — 6360000002 HC RX W HCPCS

## 2024-11-08 PROCEDURE — 88302 TISSUE EXAM BY PATHOLOGIST: CPT

## 2024-11-08 DEVICE — INTERFYL 1.0 ML FLOWABLE: Type: IMPLANTABLE DEVICE | Site: FOOT | Status: FUNCTIONAL

## 2024-11-08 DEVICE — GRAFT HUM TISS W40XL70MM THK1MM ACELLULAR ARTHROFLEX: Type: IMPLANTABLE DEVICE | Site: FOOT | Status: FUNCTIONAL

## 2024-11-08 RX ORDER — HYDROCODONE BITARTRATE AND ACETAMINOPHEN 5; 325 MG/1; MG/1
1 TABLET ORAL EVERY 6 HOURS PRN
Qty: 20 TABLET | Refills: 0 | Status: SHIPPED | OUTPATIENT
Start: 2024-11-08 | End: 2024-11-15

## 2024-11-08 RX ORDER — FENTANYL CITRATE 50 UG/ML
INJECTION, SOLUTION INTRAMUSCULAR; INTRAVENOUS
Status: DISCONTINUED | OUTPATIENT
Start: 2024-11-08 | End: 2024-11-08 | Stop reason: SDUPTHER

## 2024-11-08 RX ORDER — DEXAMETHASONE SODIUM PHOSPHATE 4 MG/ML
INJECTION, SOLUTION INTRA-ARTICULAR; INTRALESIONAL; INTRAMUSCULAR; INTRAVENOUS; SOFT TISSUE
Status: DISCONTINUED | OUTPATIENT
Start: 2024-11-08 | End: 2024-11-08 | Stop reason: SDUPTHER

## 2024-11-08 RX ORDER — SODIUM CHLORIDE 9 MG/ML
INJECTION, SOLUTION INTRAVENOUS PRN
Status: DISCONTINUED | OUTPATIENT
Start: 2024-11-08 | End: 2024-11-08 | Stop reason: HOSPADM

## 2024-11-08 RX ORDER — PROPOFOL 10 MG/ML
INJECTION, EMULSION INTRAVENOUS
Status: DISCONTINUED | OUTPATIENT
Start: 2024-11-08 | End: 2024-11-08 | Stop reason: SDUPTHER

## 2024-11-08 RX ORDER — NALOXONE HYDROCHLORIDE 0.4 MG/ML
INJECTION, SOLUTION INTRAMUSCULAR; INTRAVENOUS; SUBCUTANEOUS PRN
Status: DISCONTINUED | OUTPATIENT
Start: 2024-11-08 | End: 2024-11-08 | Stop reason: HOSPADM

## 2024-11-08 RX ORDER — ONDANSETRON 2 MG/ML
INJECTION INTRAMUSCULAR; INTRAVENOUS
Status: DISCONTINUED | OUTPATIENT
Start: 2024-11-08 | End: 2024-11-08 | Stop reason: SDUPTHER

## 2024-11-08 RX ORDER — SODIUM CHLORIDE 0.9 % (FLUSH) 0.9 %
5-40 SYRINGE (ML) INJECTION EVERY 12 HOURS SCHEDULED
Status: DISCONTINUED | OUTPATIENT
Start: 2024-11-08 | End: 2024-11-08 | Stop reason: HOSPADM

## 2024-11-08 RX ORDER — SODIUM CHLORIDE 0.9 % (FLUSH) 0.9 %
5-40 SYRINGE (ML) INJECTION PRN
Status: DISCONTINUED | OUTPATIENT
Start: 2024-11-08 | End: 2024-11-08 | Stop reason: HOSPADM

## 2024-11-08 RX ORDER — FENTANYL CITRATE 50 UG/ML
50 INJECTION, SOLUTION INTRAMUSCULAR; INTRAVENOUS EVERY 5 MIN PRN
Status: DISCONTINUED | OUTPATIENT
Start: 2024-11-08 | End: 2024-11-08 | Stop reason: HOSPADM

## 2024-11-08 RX ORDER — LIDOCAINE HYDROCHLORIDE 20 MG/ML
INJECTION, SOLUTION EPIDURAL; INFILTRATION; INTRACAUDAL; PERINEURAL
Status: DISCONTINUED | OUTPATIENT
Start: 2024-11-08 | End: 2024-11-08 | Stop reason: SDUPTHER

## 2024-11-08 RX ORDER — OXYCODONE HYDROCHLORIDE 5 MG/1
5 TABLET ORAL
Status: DISCONTINUED | OUTPATIENT
Start: 2024-11-08 | End: 2024-11-08 | Stop reason: HOSPADM

## 2024-11-08 RX ORDER — MIDAZOLAM HYDROCHLORIDE 1 MG/ML
INJECTION, SOLUTION INTRAMUSCULAR; INTRAVENOUS
Status: DISCONTINUED | OUTPATIENT
Start: 2024-11-08 | End: 2024-11-08 | Stop reason: SDUPTHER

## 2024-11-08 RX ORDER — BUPIVACAINE HYDROCHLORIDE 5 MG/ML
INJECTION, SOLUTION EPIDURAL; INTRACAUDAL PRN
Status: DISCONTINUED | OUTPATIENT
Start: 2024-11-08 | End: 2024-11-08 | Stop reason: ALTCHOICE

## 2024-11-08 RX ADMIN — ONDANSETRON 4 MG: 2 INJECTION INTRAMUSCULAR; INTRAVENOUS at 11:03

## 2024-11-08 RX ADMIN — LIDOCAINE HYDROCHLORIDE 60 MG: 20 INJECTION, SOLUTION EPIDURAL; INFILTRATION; INTRACAUDAL; PERINEURAL at 10:27

## 2024-11-08 RX ADMIN — FENTANYL CITRATE 50 MCG: 50 INJECTION, SOLUTION INTRAMUSCULAR; INTRAVENOUS at 10:34

## 2024-11-08 RX ADMIN — FENTANYL CITRATE 50 MCG: 50 INJECTION, SOLUTION INTRAMUSCULAR; INTRAVENOUS at 10:27

## 2024-11-08 RX ADMIN — PROPOFOL 130 MG: 10 INJECTION, EMULSION INTRAVENOUS at 10:27

## 2024-11-08 RX ADMIN — MIDAZOLAM 1 MG: 1 INJECTION INTRAMUSCULAR; INTRAVENOUS at 10:22

## 2024-11-08 RX ADMIN — MIDAZOLAM 1 MG: 1 INJECTION INTRAMUSCULAR; INTRAVENOUS at 10:26

## 2024-11-08 RX ADMIN — WATER 2000 MG: 1 INJECTION INTRAMUSCULAR; INTRAVENOUS; SUBCUTANEOUS at 10:25

## 2024-11-08 RX ADMIN — DEXAMETHASONE SODIUM PHOSPHATE 10 MG: 4 INJECTION, SOLUTION INTRAMUSCULAR; INTRAVENOUS at 10:35

## 2024-11-08 RX ADMIN — SODIUM CHLORIDE: 9 INJECTION, SOLUTION INTRAVENOUS at 10:22

## 2024-11-08 ASSESSMENT — LIFESTYLE VARIABLES: SMOKING_STATUS: 0

## 2024-11-08 NOTE — ANESTHESIA POSTPROCEDURE EVALUATION
Department of Anesthesiology  Postprocedure Note    Patient: Daria Cervantes  MRN: [id number]  YOB: 1955  Date of evaluation: 11/8/2024    Procedure Summary       Date: 11/08/24 Room / Location: [address]    Anesthesia Start: 1022 Anesthesia Stop: 1128    Procedure: REPAIR OF PERONEAL TENDON RIGHT FOOT FOOT   +++ARTHREX+++       ++STAFF TO ROOM 10++ (Right: Foot) Diagnosis:       Peroneal tendon rupture, right, initial encounter      (Peroneal tendon rupture, right, initial encounter [S86.311A])    Surgeons: Jefferson Greer DPM Responsible Provider: Josephine Sibley DO    Anesthesia Type: general ASA Status: 3            Anesthesia Type: No value filed.    Abel Phase I: Abel Score: 8    Abel Phase II:      Anesthesia Post Evaluation    Patient location during evaluation: PACU  Patient participation: complete - patient participated  Level of consciousness: awake and alert  Nausea & Vomiting: no vomiting and no nausea  Cardiovascular status: hemodynamically stable  Respiratory status: acceptable and spontaneous ventilation  Hydration status: stable  Pain management: adequate    No notable events documented.

## 2024-11-08 NOTE — ANESTHESIA PRE PROCEDURE
Department of Anesthesiology  Preprocedure Note       Name:  Daria Cervantes   Age:  69 y.o.  :  1955                                          MRN:  [id number]         Date:  2024      Surgeon: Surgeon(s):  Jefferson Greer DPM    Procedure: Procedure(s):  REPAIR OF PERONEAL TENDON RIGHT FOOT FOOT   +++ARTHREX+++       ++STAFF TO ROOM 10++    Medications prior to admission:   Prior to Admission medications    Medication Sig Start Date End Date Taking? Authorizing Provider   melatonin 3 MG TABS tablet Take 2 tablets by mouth at bedtime   Yes You Duron MD   ondansetron (ZOFRAN-ODT) 4 MG disintegrating tablet Take 1 tablet by mouth every 8 hours as needed for Nausea or Vomiting   Yes You Duron MD   amLODIPine (NORVASC) 5 MG tablet Take 1 tablet by mouth daily 24  Yes You Duron MD   omeprazole (PRILOSEC) 20 MG delayed release capsule Take 1 capsule by mouth Daily 8/3/20  Yes Shea Kaur MD   rosuvastatin (CRESTOR) 5 MG tablet Take 1 tablet by mouth daily    You Duron MD   mirtazapine (REMERON) 15 MG tablet  22   You Duron MD   cyclobenzaprine (FLEXERIL) 10 MG tablet Take 1 tablet by mouth 3 times daily as needed for Muscle spasms    You Duron MD   vitamin C (ASCORBIC ACID) 500 MG tablet Take 1 tablet by mouth daily    You Duron MD   aspirin 81 MG EC tablet Take by mouth To verify last dose w  18   You Duron MD   vitamin B-12 (CYANOCOBALAMIN) 1000 MCG tablet Take 1 tablet by mouth daily    You Duron MD   Cholecalciferol (VITAMIN D3) 1000 units CAPS Take 2 capsules by mouth daily    You Duron MD       Current medications:    Current Facility-Administered Medications   Medication Dose Route Frequency Provider Last Rate Last Admin    sodium chloride flush 0.9 % injection 5-40 mL  5-40 mL IntraVENous 2 times per day Jefferson Greer DPM        sodium chloride flush 0.9 %

## 2024-11-08 NOTE — OP NOTE
Operative Note      Patient: Daria Cervantes  YOB: 1955  MRN: [id number]    Date of Procedure: 11/8/2024    Pre-Op Diagnosis Codes:      * Peroneal tendon rupture, right, initial encounter [S86.311A]    Post-Op Diagnosis: Same       Procedure(s):  REPAIR OF PERONEAL TENDON RIGHT FOOT FOOT   +++ARTHREX+++       ++STAFF TO ROOM 10++    Surgeon(s):  Jefferson Greer DPM    Assistant:   Resident: Abdiel Wiley DPM    Anesthesia: General    Estimated Blood Loss (mL): less than 50     Complications: None    Specimens:   ID Type Source Tests Collected by Time Destination   A : RIGHT FOOT NERVE Tissue Tissue SURGICAL PATHOLOGY Jefferson Greer DPM 11/8/2024 1042        Implants:  Implant Name Type Inv. Item Serial No.  Lot No. LRB No. Used Action   INTERFYL 1.0 ML FLOWABLE - [id number]  INTERFYL 1.0 ML FLOWABLE [id number] [employer]  Right 1 Implanted   GRAFT HUM TISS [id number] THK1MM ACELLULAR ARTHROFLEX - [id number]  GRAFT HUM TISS [id number] THK1MM ACELLULAR ARTHROFLEX [id number] [facility] TISSUE B[facility]-WD  Right 1 Implanted         Drains: * No LDAs found *    Findings:  Infection Present At Time Of Surgery (PATOS) (choose all levels that have infection present):  No infection present  Other Findings: Split tendon tear of peroneus brevis tendon measuring 3 cm    Detailed Description of Procedure:   Following satisfactory preop evaluation the patient was brought into the OR table and placed in the OR table in the supine position.  General sedation was administered by anesthesia.  Following sedation a well-padded pneumatic tourniquet was placed on the right thigh.  The right foot was then prepped and draped in the usual sterile manner.  The foot was then entered Esmarch and the tourniquet inflated to 350 mmHg.    -Attention was then directed to the lateral right ankle.  A curvilinear incision was made by a 15 blade from proximal lateral fibula to the distal plantar fibula over the

## 2024-11-08 NOTE — BRIEF OP NOTE
Brief Postoperative Note      Patient: Daria Cervantes  YOB: 1955  MRN: [id number]    Date of Procedure: 11/8/2024    Pre-Op Diagnosis Codes:      * Peroneal tendon rupture, right, initial encounter [S86.311A]    Post-Op Diagnosis: Same       Procedure(s):  REPAIR OF PERONEAL TENDON RIGHT FOOT FOOT   +++ARTHREX+++       ++STAFF TO ROOM 10++    Surgeon(s):  Jefferson Greer DPM    Assistant:  Resident: Abdiel Wiley DPM    Anesthesia: General    Estimated Blood Loss (mL): less than 50     Complications: None    Specimens:   ID Type Source Tests Collected by Time Destination   A : RIGHT FOOT NERVE Tissue Tissue SURGICAL PATHOLOGY Jefferson Greer DPM 11/8/2024 1042        Implants:  Implant Name Type Inv. Item Serial No.  Lot No. LRB No. Used Action   INTERFYL 1.0 ML FLOWABLE - [id number]  INTERFYL 1.0 ML FLOWABLE [id number] [employer]  Right 1 Implanted   GRAFT HUM TISS [id number] THK1MM ACELLULAR ARTHROFLEX - [id number]  GRAFT HUM TISS [id number] THK1MM ACELLULAR ARTHROFLEX [id number] [facility] TISSUE BANK-WD  Right 1 Implanted         Drains: * No LDAs found *    Findings:  Infection Present At Time Of Surgery (PATOS) (choose all levels that have infection present):  No infection present  Other Findings: Consistent with operative note    Electronically signed by Abdiel Wiley DPM on 11/8/2024 at 11:27 AM

## 2024-11-08 NOTE — DISCHARGE INSTRUCTIONS
Home Care    Follow these guidelines after surgery:   Rest. Try to move as tolerated. A mix of rest and light activity improves healing.   The incision area may be sore for a few days. To minimize pain and soreness:   Take pain medicine as directed.   Avoid weight bearing to the operative extremity until cleared with physician    Diet    You can return to your regular diet. You may work with a dietician who will help you follow a heart-healthy diet.   Physical Activity             Ask your doctor when you will be able to return to work.    Do not drive unless your doctor has given you permission to do so.      When taking medicines:   Take your medicine as directed. Do not change the amount or the schedule.   Do not stop taking them without talking to your doctor.   Do not share them.   Know what results and side effects to look for. Report them to your doctor.   Some drugs can be dangerous when mixed. Talk to a doctor or pharmacist if you are taking more than one drug. This includes over-the-counter medicines and herb or dietary supplements.   Plan ahead for refills so you do not run out.     Lifestyle Changes    You and your doctor will plan lifestyle changes that will help you recover. Some things to keep in mind include:   Atherosclerosis and high blood pressure should be carefully managed. This can be done with medicines and a healthy lifestyle.   If you smoke, talk to your doctor about quitting.     Follow-up   Call Your Doctor If Any of the Following Occurs   After you leave the hospital, call your doctor if any of the following occurs:   Redness, swelling, increasing pain, excessive bleeding, or discharge at the incision site   Signs of infection, including fever and chills   Any change of color or sensation in your legs or feet   Burning, pain, or other problems when urinating   Nausea or vomiting   Abdominal cramps or diarrhea   Unusual fatigue or depression   Disorientation or confusion   Numbness or

## 2024-11-12 ENCOUNTER — OFFICE VISIT (OUTPATIENT)
Dept: PODIATRY | Age: 69
End: 2024-11-12

## 2024-11-12 VITALS
TEMPERATURE: 97.2 F | OXYGEN SATURATION: 97 % | SYSTOLIC BLOOD PRESSURE: 120 MMHG | WEIGHT: 165 LBS | HEART RATE: 87 BPM | DIASTOLIC BLOOD PRESSURE: 74 MMHG | BODY MASS INDEX: 31.18 KG/M2

## 2024-11-12 DIAGNOSIS — Z09 POSTOPERATIVE EXAMINATION: Primary | ICD-10-CM

## 2024-11-12 PROCEDURE — 99024 POSTOP FOLLOW-UP VISIT: CPT | Performed by: PODIATRIST

## 2024-11-12 NOTE — PROGRESS NOTES
Postop Progress Note    Subjective    Daria Cervantes presents to the office 4 days  following ankle. Pain is controlled with current analgesics.      Objective    Vitals:    11/12/24 1250   BP: 120/74   Pulse: 87   Temp: 97.2 °F (36.2 °C)   SpO2: 97%     General: alert, cooperative, and no distress  Incision: healing well    Assessment    Doing well postoperatively.     Plan   1. Continue any current medications  2. Wound care discussed  3. Wound/Incision: healing well, no drainage, no erythema, no hernia, no seroma, no swelling, well approximated  4. Disposition:   Limited activities.  No heavy lifting.  No strenuous exercise.  Should not return to gym class or sports until cleared by a physician.  5. Diet: regular diet  6. Follow up: 1 weeks.           Electronically signed by Jefferson Greer DPM on 11/12/2024 at 1:09 PM

## 2024-11-13 LAB — SURGICAL PATHOLOGY REPORT: NORMAL

## 2024-11-21 ENCOUNTER — OFFICE VISIT (OUTPATIENT)
Dept: PODIATRY | Age: 69
End: 2024-11-21

## 2024-11-21 VITALS
WEIGHT: 165 LBS | HEART RATE: 66 BPM | TEMPERATURE: 97.2 F | OXYGEN SATURATION: 98 % | DIASTOLIC BLOOD PRESSURE: 80 MMHG | SYSTOLIC BLOOD PRESSURE: 138 MMHG | BODY MASS INDEX: 31.18 KG/M2

## 2024-11-21 DIAGNOSIS — Z09 POSTOPERATIVE EXAMINATION: Primary | ICD-10-CM

## 2024-11-21 PROCEDURE — 99024 POSTOP FOLLOW-UP VISIT: CPT | Performed by: PODIATRIST

## 2024-11-21 NOTE — PROGRESS NOTES
Postop Progress Note    Subjective    Daria Cervantes presents to the office 2 weeks  following ankle . The patient is not having any pain.      Objective    Vitals:    11/21/24 1121   BP: 138/80   Pulse: 66   Temp: 97.2 °F (36.2 °C)   SpO2: 98%     General: alert, cooperative, and no distress  Incision: healing well    Assessment    Doing well postoperatively.     Plan   1. Continue any current medications  2. Wound care discussed  3. Wound/Incision: healing well  4. Disposition:   Limited activities.  No heavy lifting.  No strenuous exercise.  Should not return to gym class or sports until cleared by a physician.  5. Diet: regular diet  6. Follow up: 2 weeks.           Electronically signed by Jefferson Greer DPM on 11/21/2024 at 11:27 AM

## 2024-12-09 ENCOUNTER — TELEPHONE (OUTPATIENT)
Dept: ORTHOPEDIC SURGERY | Age: 69
End: 2024-12-09

## 2024-12-09 ENCOUNTER — OFFICE VISIT (OUTPATIENT)
Dept: PODIATRY | Age: 69
End: 2024-12-09

## 2024-12-09 VITALS
TEMPERATURE: 98.2 F | HEIGHT: 61 IN | OXYGEN SATURATION: 96 % | BODY MASS INDEX: 30.21 KG/M2 | WEIGHT: 160 LBS | HEART RATE: 87 BPM

## 2024-12-09 DIAGNOSIS — Z09 POSTOPERATIVE EXAMINATION: Primary | ICD-10-CM

## 2024-12-09 PROCEDURE — 99024 POSTOP FOLLOW-UP VISIT: CPT | Performed by: PODIATRIST

## 2024-12-09 RX ORDER — GABAPENTIN 100 MG/1
100 CAPSULE ORAL 2 TIMES DAILY
Qty: 120 CAPSULE | Refills: 0 | Status: SHIPPED | OUTPATIENT
Start: 2024-12-09 | End: 2025-02-07

## 2024-12-09 NOTE — PROGRESS NOTES
Pt presents this AM for a post op check concerning peroneal tendon repair of R foot, performed on 11/8/2024.

## 2024-12-09 NOTE — PROGRESS NOTES
Postop Progress Note    Subjective    Daria Cervantes presents to the office 4 weeks  following ankle sx . The patient is not having any pain.      Objective    Vitals:    12/09/24 1002   Pulse: 87   Temp: 98.2 °F (36.8 °C)   SpO2: 96%     General: alert, cooperative, and no distress  Incision: healing well    Assessment    Doing well postoperatively.     Plan   1. Continue any current medications  2. Wound care discussed  3. Wound/Incision: healing well  4. Disposition:   Limited activities.  No heavy lifting.  No strenuous exercise.  Should not return to gym class or sports until cleared by a physician.  5. Diet: regular diet  6. Follow up: 3 weeks.           Electronically signed by Jefferson Greer DPM on 12/9/2024 at 10:22 AM

## 2024-12-09 NOTE — TELEPHONE ENCOUNTER
Returned pt voicemail to Dr. Greer's office regarding Gabapentin prior auth.  I confirmed with pt pharmacy and they advised me that the medication was part of their discount card bringing the medication to $14.  Pt voiced understanding and will  medication at her convenience.

## 2025-01-02 ENCOUNTER — OFFICE VISIT (OUTPATIENT)
Dept: PODIATRY | Age: 70
End: 2025-01-02

## 2025-01-02 VITALS
BODY MASS INDEX: 30.23 KG/M2 | WEIGHT: 160 LBS | DIASTOLIC BLOOD PRESSURE: 70 MMHG | OXYGEN SATURATION: 96 % | SYSTOLIC BLOOD PRESSURE: 141 MMHG | HEART RATE: 86 BPM | TEMPERATURE: 97.7 F

## 2025-01-02 DIAGNOSIS — Z09 POSTOPERATIVE EXAMINATION: Primary | ICD-10-CM

## 2025-01-02 PROCEDURE — 99024 POSTOP FOLLOW-UP VISIT: CPT | Performed by: PODIATRIST

## 2025-01-02 RX ORDER — GABAPENTIN 100 MG/1
100 CAPSULE ORAL 3 TIMES DAILY
Qty: 180 CAPSULE | Refills: 0 | Status: SHIPPED | OUTPATIENT
Start: 2025-01-02 | End: 2025-03-03

## 2025-01-02 NOTE — PROGRESS NOTES
Postop Progress Note    Subjective    Daria Cervantes presents to the office 7 weeks  following ankle sx . Pain is controlled with current analgesics.      Objective    Vitals:    01/02/25 1433   BP: (!) 141/70   Pulse: 86   Temp: 97.7 °F (36.5 °C)   SpO2: 96%     General: alert, cooperative, and no distress  Incision: healing well    Assessment    Doing well postoperatively.     Plan   1. Continue any current medications  2. Wound care discussed  3. Wound/Incision: healing well  4. Disposition:   Pt is to increase activities as tolerated.  Should not return to gym class or sports until cleared by a physician.  5. Diet: regular diet  6. Follow up: 4 weeks.         New gabapentin to three times a day   d/c  scooter wt bearing in cam walker with cane   Electronically signed by Jefferson Greer DPM on 1/2/2025 at 2:46 PM

## 2025-01-17 ENCOUNTER — OFFICE VISIT (OUTPATIENT)
Dept: PODIATRY | Age: 70
End: 2025-01-17

## 2025-01-17 VITALS
SYSTOLIC BLOOD PRESSURE: 124 MMHG | DIASTOLIC BLOOD PRESSURE: 90 MMHG | TEMPERATURE: 97.2 F | OXYGEN SATURATION: 98 % | WEIGHT: 160 LBS | BODY MASS INDEX: 30.23 KG/M2 | HEART RATE: 78 BPM

## 2025-01-17 DIAGNOSIS — Z09 POSTOPERATIVE EXAMINATION: Primary | ICD-10-CM

## 2025-01-17 PROCEDURE — 99024 POSTOP FOLLOW-UP VISIT: CPT | Performed by: PODIATRIST

## 2025-01-17 NOTE — PROGRESS NOTES
Postop Progress Note    Subjective    Daria Cervantes presents to the office 3 months  following ankle sc . The patient is not having any pain.      Objective    Vitals:    01/17/25 1548   BP: (!) 124/90   Pulse: 78   Temp: 97.2 °F (36.2 °C)   SpO2: 98%     General: alert, cooperative, and no distress  Incision: healing well    Assessment    Doing well postoperatively.     Plan   1. Continue any current medications  2. Wound care discussed  3. Wound/Incision: healing well  4. Disposition:   Pt is to increase activities as tolerated.  Normal activities with no limitations.  May return to full duty immediately with no restrictions.  5. Diet: regular diet  6. Follow up: 9 months.           Electronically signed by Jefferson Greer DPM on 1/17/2025 at 3:54 PM

## (undated) DEVICE — 4-PORT MANIFOLD: Brand: NEPTUNE 2

## (undated) DEVICE — TOWEL,OR,DSP,ST,BLUE,STD,6/PK,12PK/CS: Brand: MEDLINE

## (undated) DEVICE — TUBING SUCT 12FR MAL ALUM SHFT FN CAP VENT UNIV CONN W/ OBT

## (undated) DEVICE — NEEDLE HYPO 25GA L1.5IN BLU POLYPR HUB S STL REG BVL STR

## (undated) DEVICE — APPLICATOR MEDICATED 26 CC SOLUTION HI LT ORNG CHLORAPREP

## (undated) DEVICE — GRADUATE TRIANG MEASURE 1000ML BLK PRNT

## (undated) DEVICE — GLOVE ORANGE PI 8   MSG9080

## (undated) DEVICE — SPONGE GZ W4XL4IN RAYON POLY FILL CVR W/ NONWOVEN FAB

## (undated) DEVICE — BLOCK BITE 60FR RUBBER ADLT DENTAL

## (undated) DEVICE — SYRINGE MED 10ML TRNSLUC BRL PLUNG BLK MRK POLYPR CTRL

## (undated) DEVICE — GAUZE,SPONGE,4"X4",8PLY,STRL,LF,10/TRAY: Brand: MEDLINE

## (undated) DEVICE — DOUBLE BASIN SET: Brand: MEDLINE INDUSTRIES, INC.

## (undated) DEVICE — DRAPE,EXTREMITY,89X128,STERILE: Brand: MEDLINE

## (undated) DEVICE — GOWN,SIRUS,FABRNF,XL,20/CS: Brand: MEDLINE

## (undated) DEVICE — Device

## (undated) DEVICE — BLADE,STAINLESS-STEEL,15,STRL,DISPOSABLE: Brand: MEDLINE

## (undated) DEVICE — ELECTRODE PT RET AD L9FT HI MOIST COND ADH HYDRGEL CORDED